# Patient Record
Sex: MALE | Race: WHITE | Employment: UNEMPLOYED | URBAN - METROPOLITAN AREA
[De-identification: names, ages, dates, MRNs, and addresses within clinical notes are randomized per-mention and may not be internally consistent; named-entity substitution may affect disease eponyms.]

---

## 2017-09-10 PROBLEM — D72.825 BANDEMIA: Status: ACTIVE | Noted: 2017-09-10

## 2018-12-28 ENCOUNTER — TELEPHONE (OUTPATIENT)
Dept: BARIATRICS/WEIGHT MGMT | Age: 23
End: 2018-12-28

## 2019-01-15 ENCOUNTER — TELEPHONE (OUTPATIENT)
Dept: BARIATRICS/WEIGHT MGMT | Age: 24
End: 2019-01-15

## 2019-01-29 ENCOUNTER — OFFICE VISIT (OUTPATIENT)
Dept: BARIATRICS/WEIGHT MGMT | Age: 24
End: 2019-01-29
Payer: COMMERCIAL

## 2019-01-29 VITALS
OXYGEN SATURATION: 98 % | SYSTOLIC BLOOD PRESSURE: 132 MMHG | WEIGHT: 315 LBS | HEART RATE: 67 BPM | HEIGHT: 69 IN | DIASTOLIC BLOOD PRESSURE: 71 MMHG | BODY MASS INDEX: 46.65 KG/M2 | RESPIRATION RATE: 18 BRPM

## 2019-01-29 DIAGNOSIS — K21.9 CHRONIC GERD: ICD-10-CM

## 2019-01-29 DIAGNOSIS — Z01.818 PRE-OPERATIVE CLEARANCE: ICD-10-CM

## 2019-01-29 DIAGNOSIS — G47.33 OBSTRUCTIVE SLEEP APNEA: ICD-10-CM

## 2019-01-29 DIAGNOSIS — E66.01 MORBID OBESITY WITH BMI OF 50.0-59.9, ADULT (HCC): Primary | ICD-10-CM

## 2019-01-29 PROCEDURE — G8417 CALC BMI ABV UP PARAM F/U: HCPCS | Performed by: SURGERY

## 2019-01-29 PROCEDURE — 1036F TOBACCO NON-USER: CPT | Performed by: SURGERY

## 2019-01-29 PROCEDURE — G8484 FLU IMMUNIZE NO ADMIN: HCPCS | Performed by: SURGERY

## 2019-01-29 PROCEDURE — G8427 DOCREV CUR MEDS BY ELIG CLIN: HCPCS | Performed by: SURGERY

## 2019-01-29 PROCEDURE — 99205 OFFICE O/P NEW HI 60 MIN: CPT | Performed by: SURGERY

## 2019-01-29 RX ORDER — DULOXETIN HYDROCHLORIDE 60 MG/1
60 CAPSULE, DELAYED RELEASE ORAL DAILY
COMMUNITY

## 2019-02-28 PROBLEM — Z01.818 PRE-OPERATIVE CLEARANCE: Status: RESOLVED | Noted: 2019-01-29 | Resolved: 2019-02-28

## 2019-03-04 ENCOUNTER — HOSPITAL ENCOUNTER (OUTPATIENT)
Age: 24
Discharge: HOME | End: 2019-03-04
Payer: COMMERCIAL

## 2019-03-04 ENCOUNTER — HOSPITAL ENCOUNTER (OUTPATIENT)
Dept: ULTRASOUND IMAGING | Age: 24
Discharge: HOME | End: 2019-03-04
Payer: COMMERCIAL

## 2019-03-04 DIAGNOSIS — E66.01 MORBID OBESITY WITH BMI OF 50.0-59.9, ADULT (HCC): ICD-10-CM

## 2019-03-04 DIAGNOSIS — K21.9 CHRONIC GERD: ICD-10-CM

## 2019-03-04 DIAGNOSIS — Z01.818 PRE-OPERATIVE CLEARANCE: ICD-10-CM

## 2019-03-04 DIAGNOSIS — G47.33 OBSTRUCTIVE SLEEP APNEA: ICD-10-CM

## 2019-03-04 LAB
A/G RATIO: 1.2 (ref 1.1–2.2)
ALBUMIN SERPL-MCNC: 4.2 G/DL (ref 3.4–5)
ALP BLD-CCNC: 97 U/L (ref 40–129)
ALT SERPL-CCNC: 19 U/L (ref 10–40)
ANION GAP SERPL CALCULATED.3IONS-SCNC: 14 MMOL/L (ref 3–16)
AST SERPL-CCNC: 13 U/L (ref 15–37)
BASOPHILS ABSOLUTE: 0 K/UL (ref 0–0.2)
BASOPHILS RELATIVE PERCENT: 0 %
BILIRUB SERPL-MCNC: <0.2 MG/DL (ref 0–1)
BUN BLDV-MCNC: 11 MG/DL (ref 7–20)
CALCIUM SERPL-MCNC: 9.8 MG/DL (ref 8.3–10.6)
CHLORIDE BLD-SCNC: 104 MMOL/L (ref 99–110)
CHOLESTEROL, TOTAL: 219 MG/DL (ref 0–199)
CO2: 26 MMOL/L (ref 21–32)
CREAT SERPL-MCNC: 0.7 MG/DL (ref 0.9–1.3)
EOSINOPHILS ABSOLUTE: 0.3 K/UL (ref 0–0.6)
EOSINOPHILS RELATIVE PERCENT: 4 %
ESTIMATED AVERAGE GLUCOSE: 114 MG/DL
FOLATE: 6.03 NG/ML (ref 4.78–24.2)
GFR AFRICAN AMERICAN: >60
GFR NON-AFRICAN AMERICAN: >60
GLOBULIN: 3.4 G/DL
GLUCOSE BLD-MCNC: 93 MG/DL (ref 70–99)
HBA1C MFR BLD: 5.6 %
HCT VFR BLD CALC: 39.9 % (ref 40.5–52.5)
HDLC SERPL-MCNC: 36 MG/DL (ref 40–60)
HEMOGLOBIN: 13.1 G/DL (ref 13.5–17.5)
IRON SATURATION: 12 % (ref 20–50)
IRON: 36 UG/DL (ref 59–158)
LDL CHOLESTEROL CALCULATED: 157 MG/DL
LYMPHOCYTES ABSOLUTE: 3 K/UL (ref 1–5.1)
LYMPHOCYTES RELATIVE PERCENT: 35 %
MCH RBC QN AUTO: 27.4 PG (ref 26–34)
MCHC RBC AUTO-ENTMCNC: 32.9 G/DL (ref 31–36)
MCV RBC AUTO: 83.1 FL (ref 80–100)
METAMYELOCYTES RELATIVE PERCENT: 1 %
MONOCYTES ABSOLUTE: 0.3 K/UL (ref 0–1.3)
MONOCYTES RELATIVE PERCENT: 3 %
NEUTROPHILS ABSOLUTE: 5 K/UL (ref 1.7–7.7)
NEUTROPHILS RELATIVE PERCENT: 57 %
PDW BLD-RTO: 15.1 % (ref 12.4–15.4)
PLATELET # BLD: 327 K/UL (ref 135–450)
PMV BLD AUTO: 8.3 FL (ref 5–10.5)
POTASSIUM SERPL-SCNC: 3.9 MMOL/L (ref 3.5–5.1)
RBC # BLD: 4.8 M/UL (ref 4.2–5.9)
RBC # BLD: NORMAL 10*6/UL
SODIUM BLD-SCNC: 144 MMOL/L (ref 136–145)
TOTAL IRON BINDING CAPACITY: 305 UG/DL (ref 260–445)
TOTAL PROTEIN: 7.6 G/DL (ref 6.4–8.2)
TRIGL SERPL-MCNC: 129 MG/DL (ref 0–150)
TSH REFLEX: 3.19 UIU/ML (ref 0.27–4.2)
VITAMIN B-12: 450 PG/ML (ref 211–911)
VITAMIN D 25-HYDROXY: 9.3 NG/ML
VLDLC SERPL CALC-MCNC: 26 MG/DL
WBC # BLD: 8.7 K/UL (ref 4–11)

## 2019-03-04 PROCEDURE — 83550 IRON BINDING TEST: CPT

## 2019-03-04 PROCEDURE — 82607 VITAMIN B-12: CPT

## 2019-03-04 PROCEDURE — 80061 LIPID PANEL: CPT

## 2019-03-04 PROCEDURE — 76705 ECHO EXAM OF ABDOMEN: CPT

## 2019-03-04 PROCEDURE — 83036 HEMOGLOBIN GLYCOSYLATED A1C: CPT

## 2019-03-04 PROCEDURE — 84597 ASSAY OF VITAMIN K: CPT

## 2019-03-04 PROCEDURE — 84207 ASSAY OF VITAMIN B-6: CPT

## 2019-03-04 PROCEDURE — 84446 ASSAY OF VITAMIN E: CPT

## 2019-03-04 PROCEDURE — 82306 VITAMIN D 25 HYDROXY: CPT

## 2019-03-04 PROCEDURE — 84425 ASSAY OF VITAMIN B-1: CPT

## 2019-03-04 PROCEDURE — 84590 ASSAY OF VITAMIN A: CPT

## 2019-03-04 PROCEDURE — 80053 COMPREHEN METABOLIC PANEL: CPT

## 2019-03-04 PROCEDURE — 36415 COLL VENOUS BLD VENIPUNCTURE: CPT

## 2019-03-04 PROCEDURE — 82746 ASSAY OF FOLIC ACID SERUM: CPT

## 2019-03-04 PROCEDURE — 85025 COMPLETE CBC W/AUTO DIFF WBC: CPT

## 2019-03-04 PROCEDURE — 84443 ASSAY THYROID STIM HORMONE: CPT

## 2019-03-04 PROCEDURE — 83540 ASSAY OF IRON: CPT

## 2019-03-06 ENCOUNTER — OFFICE VISIT (OUTPATIENT)
Dept: BARIATRICS/WEIGHT MGMT | Age: 24
End: 2019-03-06
Payer: COMMERCIAL

## 2019-03-06 VITALS
DIASTOLIC BLOOD PRESSURE: 78 MMHG | OXYGEN SATURATION: 99 % | WEIGHT: 315 LBS | BODY MASS INDEX: 46.65 KG/M2 | RESPIRATION RATE: 16 BRPM | HEART RATE: 86 BPM | HEIGHT: 69 IN | SYSTOLIC BLOOD PRESSURE: 136 MMHG

## 2019-03-06 DIAGNOSIS — E61.1 IRON DEFICIENCY: ICD-10-CM

## 2019-03-06 DIAGNOSIS — E78.5 HYPERLIPIDEMIA, UNSPECIFIED HYPERLIPIDEMIA TYPE: ICD-10-CM

## 2019-03-06 DIAGNOSIS — K21.9 CHRONIC GERD: ICD-10-CM

## 2019-03-06 DIAGNOSIS — E66.01 MORBID OBESITY WITH BMI OF 50.0-59.9, ADULT (HCC): Primary | ICD-10-CM

## 2019-03-06 DIAGNOSIS — E55.9 VITAMIN D DEFICIENCY: ICD-10-CM

## 2019-03-06 DIAGNOSIS — K76.0 FATTY LIVER: ICD-10-CM

## 2019-03-06 PROCEDURE — 99213 OFFICE O/P EST LOW 20 MIN: CPT | Performed by: NURSE PRACTITIONER

## 2019-03-06 RX ORDER — FERROUS SULFATE 325(65) MG
325 TABLET ORAL 2 TIMES DAILY WITH MEALS
Qty: 60 TABLET | Refills: 3 | Status: SHIPPED | OUTPATIENT
Start: 2019-03-06 | End: 2019-06-10

## 2019-03-06 RX ORDER — ERGOCALCIFEROL 1.25 MG/1
50000 CAPSULE ORAL WEEKLY
Qty: 4 CAPSULE | Refills: 2 | Status: SHIPPED | OUTPATIENT
Start: 2019-03-06 | End: 2019-06-10

## 2019-03-06 ASSESSMENT — ENCOUNTER SYMPTOMS
EYES NEGATIVE: 1
RESPIRATORY NEGATIVE: 1
ALLERGIC/IMMUNOLOGIC NEGATIVE: 1
GASTROINTESTINAL NEGATIVE: 1

## 2019-03-07 LAB
ALPHA-TOCOPHEROL: 6.9 MG/L (ref 5.5–18)
GAMMA-TOCOPHEROL: 1.9 MG/L (ref 0–6)
RETINYL PALMITATE: <0.02 MG/L (ref 0–0.1)
VITAMIN A LEVEL: 0.38 MG/L (ref 0.3–1.2)
VITAMIN A, INTERP: NORMAL
VITAMIN B1, PLASMA: 4 NMOL/L (ref 4–15)
VITAMIN B6: 11 NMOL/L (ref 20–125)
VITAMIN K1: 0.29 NMOL/L (ref 0.22–4.88)

## 2019-03-31 ASSESSMENT — ENCOUNTER SYMPTOMS
EYES NEGATIVE: 1
RESPIRATORY NEGATIVE: 1
GASTROINTESTINAL NEGATIVE: 1
ALLERGIC/IMMUNOLOGIC NEGATIVE: 1

## 2019-03-31 NOTE — PROGRESS NOTES
Never Used   Substance Use Topics    Alcohol use: Yes     Comment: rare     I counseled the patient on the importance of not smoking and risks of ETOH. No Known Allergies  Vitals:    04/08/19 1312   BP: 136/82   Pulse: 66   Resp: 16   SpO2: 98%   Weight: (!) 355 lb (161 kg)   Height: 5' 9\" (1.753 m)     Body mass index is 52.42 kg/m².     Current Outpatient Medications:     vitamin D (ERGOCALCIFEROL) 00744 units CAPS capsule, Take 1 capsule by mouth once a week, Disp: 4 capsule, Rfl: 2    ferrous sulfate 325 (65 Fe) MG tablet, Take 1 tablet by mouth 2 times daily (with meals), Disp: 60 tablet, Rfl: 3    DULoxetine (CYMBALTA) 60 MG extended release capsule, Take 60 mg by mouth daily, Disp: , Rfl:     ARIPiprazole (ABILIFY) 15 MG tablet, Take 20 mg by mouth, Disp: , Rfl:     atorvastatin (LIPITOR) 10 MG tablet, Take 10 mg by mouth, Disp: , Rfl:     methylphenidate (CONCERTA) 18 MG extended release tablet, Take 18 mg by mouth ., Disp: , Rfl:     Lab Results   Component Value Date    WBC 8.7 03/04/2019    RBC 4.80 03/04/2019    HGB 13.1 03/04/2019    HCT 39.9 03/04/2019    MCV 83.1 03/04/2019    MCH 27.4 03/04/2019    MCHC 32.9 03/04/2019    MPV 8.3 03/04/2019    NEUTOPHILPCT 57.0 03/04/2019    LYMPHOPCT 35.0 03/04/2019    MONOPCT 3.0 03/04/2019    EOSRELPCT 4.0 03/04/2019    BASOPCT 0.0 03/04/2019    NEUTROABS 5.0 03/04/2019    LYMPHSABS 3.0 03/04/2019    MONOSABS 0.3 03/04/2019    EOSABS 0.3 03/04/2019     Lab Results   Component Value Date     03/04/2019    K 3.9 03/04/2019     03/04/2019    CO2 26 03/04/2019    ANIONGAP 14 03/04/2019    GLUCOSE 93 03/04/2019    BUN 11 03/04/2019    CREATININE 0.7 03/04/2019    LABGLOM >60 03/04/2019    GFRAA >60 03/04/2019    CALCIUM 9.8 03/04/2019    PROT 7.6 03/04/2019    LABALBU 4.2 03/04/2019    AGRATIO 1.2 03/04/2019    BILITOT <0.2 03/04/2019    ALKPHOS 97 03/04/2019    ALT 19 03/04/2019    AST 13 03/04/2019    GLOB 3.4 03/04/2019     Lab Results Component Value Date    CHOL 219 03/04/2019    TRIG 129 03/04/2019    HDL 36 03/04/2019    LDLCALC 157 03/04/2019    LABVLDL 26 03/04/2019     Lab Results   Component Value Date    TSHREFLEX 3.19 03/04/2019     Lab Results   Component Value Date    IRON 36 03/04/2019    TIBC 305 03/04/2019    LABIRON 12 03/04/2019     Lab Results   Component Value Date    BKXUONYI07 450 03/04/2019    FOLATE 6.03 03/04/2019     Lab Results   Component Value Date    VITD25 9.3 03/04/2019     Lab Results   Component Value Date    LABA1C 5.6 03/04/2019    .0 03/04/2019     Review of Systems   Constitutional: Negative. HENT: Negative. Eyes: Negative. Respiratory: Negative. Cardiovascular: Negative. Gastrointestinal: Negative. Endocrine: Negative. Genitourinary: Negative. Musculoskeletal: Negative. Skin: Negative. Allergic/Immunologic: Negative. Neurological: Negative. Hematological: Negative. Psychiatric/Behavioral: Negative. Objective:   Physical Exam   Constitutional: He is oriented to person, place, and time. He appears well-developed and well-nourished. HENT:   Head: Normocephalic and atraumatic. Eyes: Pupils are equal, round, and reactive to light. Conjunctivae are normal.   Neck: Normal range of motion. Neck supple. Cardiovascular: Normal rate. Pulmonary/Chest: Effort normal.   Abdominal: Soft. Musculoskeletal: Normal range of motion. Neurological: He is alert and oriented to person, place, and time. Skin: Skin is warm and dry. Psychiatric: He has a normal mood and affect. His behavior is normal. Judgment and thought content normal.   Vitals reviewed. Assessment and Plan:   Patient is here for their 3rd presurgery visit for sleeve, down 14 lbs. The patient's current Body mass index is 52.42 kg/m². (4/8/19). He is making dietary and behavior modifications. He did meet with the registered dietitian for continued follow up. I agree with recommendations and plan. 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 Encouraged continued physical activity. Discussed preop work up which still needs support group, EGD (scheduled) and psych evaluation (scheduled). We will see him back in 1 month for continued follow up. If the patient is able to maintain consistency with his dietary behaviors and has completed the remainder of his preoperative requirements by his next visit he should be ready for a surgery date. A total of 15 minutes was spent face-to-face with the patient and over half of that time was spent counseling the patient on proper dietary behaviors, exercise and preoperative work-up.

## 2019-04-02 ENCOUNTER — ANESTHESIA EVENT (OUTPATIENT)
Dept: ENDOSCOPY | Age: 24
End: 2019-04-02
Payer: COMMERCIAL

## 2019-04-08 ENCOUNTER — OFFICE VISIT (OUTPATIENT)
Dept: BARIATRICS/WEIGHT MGMT | Age: 24
End: 2019-04-08
Payer: COMMERCIAL

## 2019-04-08 VITALS
HEIGHT: 69 IN | DIASTOLIC BLOOD PRESSURE: 82 MMHG | SYSTOLIC BLOOD PRESSURE: 136 MMHG | BODY MASS INDEX: 46.65 KG/M2 | HEART RATE: 66 BPM | WEIGHT: 315 LBS | RESPIRATION RATE: 16 BRPM | OXYGEN SATURATION: 98 %

## 2019-04-08 DIAGNOSIS — K76.0 FATTY LIVER: ICD-10-CM

## 2019-04-08 DIAGNOSIS — E66.01 MORBID OBESITY WITH BMI OF 50.0-59.9, ADULT (HCC): Primary | ICD-10-CM

## 2019-04-08 DIAGNOSIS — E78.5 HYPERLIPIDEMIA, UNSPECIFIED HYPERLIPIDEMIA TYPE: ICD-10-CM

## 2019-04-08 DIAGNOSIS — K21.9 CHRONIC GERD: ICD-10-CM

## 2019-04-08 DIAGNOSIS — G47.33 OBSTRUCTIVE SLEEP APNEA: ICD-10-CM

## 2019-04-08 PROCEDURE — G8427 DOCREV CUR MEDS BY ELIG CLIN: HCPCS | Performed by: NURSE PRACTITIONER

## 2019-04-08 PROCEDURE — G8417 CALC BMI ABV UP PARAM F/U: HCPCS | Performed by: NURSE PRACTITIONER

## 2019-04-08 PROCEDURE — 99213 OFFICE O/P EST LOW 20 MIN: CPT | Performed by: NURSE PRACTITIONER

## 2019-04-08 PROCEDURE — 1036F TOBACCO NON-USER: CPT | Performed by: NURSE PRACTITIONER

## 2019-04-08 NOTE — PATIENT INSTRUCTIONS
Goals in preparing for bariatric surgery  You should be giving up all beverages that have carbonation, sugar, and caffeine. (Refer to the approved liquids list provided at initial visit)   You should be drinking 64 ounces of calorie free fluids per day. Suggestions include:  o Water (you may add fresh lemon or lime)  o Crystal Light  o Acra Liquid Water Enhancer  o Propel Zero  o Powerade Zero  o Isopure  o Abdgh5N  o SOBE Lifewater Zero  o Vitamin Water Zero  o Sugar Free Ian-Aid  You should be eating 4-6 times per day.  Three small meals plus 1-2 snacks per day is your goal. This balances your calories and nutrients evenly throughout the day and helps to boost your metabolism. Snacking is a good thing if you are choosing healthful options. Refer to the snack list provided at your initial visit. Aim for a protein at every snack, plus a fruit, vegetable or starch. You should be eating protein at every meal and snack.  Protein is typically found in animal sources, i.e. chicken, beef, pork, fish and seafood, eggs. It is also found in low-fat dairy sources such as skim or 1% milk, low-fat yogurt, low-fat cheese, and low-fat cottage cheese. Plant based sources of protein include peanut butter, beans, and soy. You should be utilizing the 9-inch plate method.  Eating on a smaller plate will help you control portion size. But what you put on your plate counts also. Make ¼ of your plate protein, ¼ starch and ½ the plate non-starchy vegetables. You should be eliminating caffeine.  Caffeine is dehydrating. After surgery, its very important to stay hydrated. Giving up caffeine before surgery will help you focus on the changes necessary to be successful after surgery. There are many decaffeinated coffee and tea products available in grocery stores. You should be reducing added fat and sugar in your diet.  Frying foods adds too much fat and calories.  Baking, broiling, or grilling meats add flavor without unhealthy fats. Using cooking oil spray and spray butter products are also healthful changes that will aid in your weight loss. Foods high in sugar are often also high in calories and low in nutrients. Eating habits after surgery will have to be a permanent and long-term change. Eating habits are so ingrained that it can be difficult to change. It is important to practice new eating habits prior to surgery to mentally prepare yourself for the challenge ahead. Also remember that overall health, age, and genetics make each persons weight loss progress different. Do not compare your progress (pre or post-operatively), the amount you eat, or exercise to other patients. In addition, it is the responsibility of the patient to schedule and follow up on labs and tests completed during the presurgical period. Results will be reviewed at each visit. Patient received dietary handouts and education.     Plan/Recommendations:   - Continue plan  - Eliminate Monster Zero  - Continue exercise

## 2019-04-19 ENCOUNTER — HOSPITAL ENCOUNTER (OUTPATIENT)
Age: 24
Setting detail: OUTPATIENT SURGERY
Discharge: HOME | End: 2019-04-19
Attending: SURGERY | Admitting: SURGERY
Payer: COMMERCIAL

## 2019-04-19 ENCOUNTER — ANESTHESIA (OUTPATIENT)
Dept: ENDOSCOPY | Age: 24
End: 2019-04-19
Payer: COMMERCIAL

## 2019-04-19 VITALS
TEMPERATURE: 98.4 F | HEART RATE: 69 BPM | HEIGHT: 71 IN | OXYGEN SATURATION: 96 % | SYSTOLIC BLOOD PRESSURE: 128 MMHG | RESPIRATION RATE: 16 BRPM | BODY MASS INDEX: 44.1 KG/M2 | DIASTOLIC BLOOD PRESSURE: 74 MMHG | WEIGHT: 315 LBS

## 2019-04-19 VITALS
DIASTOLIC BLOOD PRESSURE: 59 MMHG | OXYGEN SATURATION: 93 % | RESPIRATION RATE: 1 BRPM | SYSTOLIC BLOOD PRESSURE: 124 MMHG

## 2019-04-19 PROCEDURE — 3609012400 HC EGD TRANSORAL BIOPSY SINGLE/MULTIPLE: Performed by: SURGERY

## 2019-04-19 PROCEDURE — 6360000002 HC RX W HCPCS: Performed by: NURSE ANESTHETIST, CERTIFIED REGISTERED

## 2019-04-19 PROCEDURE — 2709999900 HC NON-CHARGEABLE SUPPLY: Performed by: SURGERY

## 2019-04-19 PROCEDURE — 88305 TISSUE EXAM BY PATHOLOGIST: CPT

## 2019-04-19 PROCEDURE — 2580000003 HC RX 258: Performed by: ANESTHESIOLOGY

## 2019-04-19 PROCEDURE — 2580000003 HC RX 258: Performed by: NURSE ANESTHETIST, CERTIFIED REGISTERED

## 2019-04-19 PROCEDURE — 2500000003 HC RX 250 WO HCPCS: Performed by: NURSE ANESTHETIST, CERTIFIED REGISTERED

## 2019-04-19 PROCEDURE — 7100000001 HC PACU RECOVERY - ADDTL 15 MIN: Performed by: SURGERY

## 2019-04-19 PROCEDURE — 7100000000 HC PACU RECOVERY - FIRST 15 MIN: Performed by: SURGERY

## 2019-04-19 PROCEDURE — 3700000000 HC ANESTHESIA ATTENDED CARE: Performed by: SURGERY

## 2019-04-19 PROCEDURE — 7100000011 HC PHASE II RECOVERY - ADDTL 15 MIN: Performed by: SURGERY

## 2019-04-19 PROCEDURE — 43239 EGD BIOPSY SINGLE/MULTIPLE: CPT | Performed by: SURGERY

## 2019-04-19 PROCEDURE — 7100000010 HC PHASE II RECOVERY - FIRST 15 MIN: Performed by: SURGERY

## 2019-04-19 RX ORDER — LIDOCAINE HYDROCHLORIDE 20 MG/ML
INJECTION, SOLUTION EPIDURAL; INFILTRATION; INTRACAUDAL; PERINEURAL PRN
Status: DISCONTINUED | OUTPATIENT
Start: 2019-04-19 | End: 2019-04-19 | Stop reason: SDUPTHER

## 2019-04-19 RX ORDER — LIDOCAINE HYDROCHLORIDE 10 MG/ML
1 INJECTION, SOLUTION EPIDURAL; INFILTRATION; INTRACAUDAL; PERINEURAL
Status: DISCONTINUED | OUTPATIENT
Start: 2019-04-19 | End: 2019-04-19 | Stop reason: HOSPADM

## 2019-04-19 RX ORDER — MEPERIDINE HYDROCHLORIDE 25 MG/ML
12.5 INJECTION INTRAMUSCULAR; INTRAVENOUS; SUBCUTANEOUS EVERY 5 MIN PRN
Status: DISCONTINUED | OUTPATIENT
Start: 2019-04-19 | End: 2019-04-19 | Stop reason: HOSPADM

## 2019-04-19 RX ORDER — HYDROMORPHONE HCL 110MG/55ML
0.25 PATIENT CONTROLLED ANALGESIA SYRINGE INTRAVENOUS EVERY 5 MIN PRN
Status: DISCONTINUED | OUTPATIENT
Start: 2019-04-19 | End: 2019-04-19 | Stop reason: HOSPADM

## 2019-04-19 RX ORDER — OXYCODONE HYDROCHLORIDE 5 MG/1
5 TABLET ORAL PRN
Status: DISCONTINUED | OUTPATIENT
Start: 2019-04-19 | End: 2019-04-19 | Stop reason: HOSPADM

## 2019-04-19 RX ORDER — GLYCOPYRROLATE 1 MG/5 ML
SYRINGE (ML) INTRAVENOUS PRN
Status: DISCONTINUED | OUTPATIENT
Start: 2019-04-19 | End: 2019-04-19 | Stop reason: SDUPTHER

## 2019-04-19 RX ORDER — LABETALOL HYDROCHLORIDE 5 MG/ML
5 INJECTION, SOLUTION INTRAVENOUS EVERY 10 MIN PRN
Status: DISCONTINUED | OUTPATIENT
Start: 2019-04-19 | End: 2019-04-19 | Stop reason: HOSPADM

## 2019-04-19 RX ORDER — HYDROMORPHONE HCL 110MG/55ML
0.5 PATIENT CONTROLLED ANALGESIA SYRINGE INTRAVENOUS EVERY 5 MIN PRN
Status: DISCONTINUED | OUTPATIENT
Start: 2019-04-19 | End: 2019-04-19 | Stop reason: HOSPADM

## 2019-04-19 RX ORDER — PROMETHAZINE HYDROCHLORIDE 25 MG/ML
6.25 INJECTION, SOLUTION INTRAMUSCULAR; INTRAVENOUS PRN
Status: DISCONTINUED | OUTPATIENT
Start: 2019-04-19 | End: 2019-04-19 | Stop reason: HOSPADM

## 2019-04-19 RX ORDER — OMEPRAZOLE 20 MG/1
20 CAPSULE, DELAYED RELEASE ORAL DAILY
Qty: 30 CAPSULE | Refills: 3 | Status: SHIPPED | OUTPATIENT
Start: 2019-04-19 | End: 2019-09-05 | Stop reason: SDUPTHER

## 2019-04-19 RX ORDER — OXYCODONE HYDROCHLORIDE 5 MG/1
10 TABLET ORAL PRN
Status: DISCONTINUED | OUTPATIENT
Start: 2019-04-19 | End: 2019-04-19 | Stop reason: HOSPADM

## 2019-04-19 RX ORDER — FENTANYL CITRATE 50 UG/ML
50 INJECTION, SOLUTION INTRAMUSCULAR; INTRAVENOUS EVERY 5 MIN PRN
Status: DISCONTINUED | OUTPATIENT
Start: 2019-04-19 | End: 2019-04-19 | Stop reason: HOSPADM

## 2019-04-19 RX ORDER — SODIUM CHLORIDE 0.9 % (FLUSH) 0.9 %
10 SYRINGE (ML) INJECTION PRN
Status: DISCONTINUED | OUTPATIENT
Start: 2019-04-19 | End: 2019-04-19 | Stop reason: HOSPADM

## 2019-04-19 RX ORDER — SODIUM CHLORIDE 9 MG/ML
INJECTION, SOLUTION INTRAVENOUS CONTINUOUS
Status: DISCONTINUED | OUTPATIENT
Start: 2019-04-19 | End: 2019-04-19 | Stop reason: HOSPADM

## 2019-04-19 RX ORDER — KETAMINE HYDROCHLORIDE 10 MG/ML
INJECTION, SOLUTION INTRAMUSCULAR; INTRAVENOUS PRN
Status: DISCONTINUED | OUTPATIENT
Start: 2019-04-19 | End: 2019-04-19 | Stop reason: SDUPTHER

## 2019-04-19 RX ORDER — PROPOFOL 10 MG/ML
INJECTION, EMULSION INTRAVENOUS PRN
Status: DISCONTINUED | OUTPATIENT
Start: 2019-04-19 | End: 2019-04-19 | Stop reason: SDUPTHER

## 2019-04-19 RX ORDER — SODIUM CHLORIDE 9 MG/ML
INJECTION, SOLUTION INTRAVENOUS CONTINUOUS PRN
Status: DISCONTINUED | OUTPATIENT
Start: 2019-04-19 | End: 2019-04-19 | Stop reason: SDUPTHER

## 2019-04-19 RX ORDER — DIPHENHYDRAMINE HYDROCHLORIDE 50 MG/ML
12.5 INJECTION INTRAMUSCULAR; INTRAVENOUS
Status: DISCONTINUED | OUTPATIENT
Start: 2019-04-19 | End: 2019-04-19 | Stop reason: HOSPADM

## 2019-04-19 RX ORDER — SODIUM CHLORIDE 0.9 % (FLUSH) 0.9 %
10 SYRINGE (ML) INJECTION EVERY 12 HOURS SCHEDULED
Status: DISCONTINUED | OUTPATIENT
Start: 2019-04-19 | End: 2019-04-19 | Stop reason: HOSPADM

## 2019-04-19 RX ADMIN — PROPOFOL 150 MG: 10 INJECTION, EMULSION INTRAVENOUS at 13:10

## 2019-04-19 RX ADMIN — KETAMINE HYDROCHLORIDE 20 MG: 10 INJECTION, SOLUTION INTRAMUSCULAR; INTRAVENOUS at 13:10

## 2019-04-19 RX ADMIN — Medication 0.2 MG: at 13:08

## 2019-04-19 RX ADMIN — SODIUM CHLORIDE: 9 INJECTION, SOLUTION INTRAVENOUS at 11:38

## 2019-04-19 RX ADMIN — LIDOCAINE HYDROCHLORIDE 100 MG: 20 INJECTION, SOLUTION EPIDURAL; INFILTRATION; INTRACAUDAL; PERINEURAL at 13:09

## 2019-04-19 RX ADMIN — PROPOFOL 50 MG: 10 INJECTION, EMULSION INTRAVENOUS at 13:13

## 2019-04-19 RX ADMIN — PROPOFOL 150 MG: 10 INJECTION, EMULSION INTRAVENOUS at 13:11

## 2019-04-19 RX ADMIN — SODIUM CHLORIDE: 9 INJECTION, SOLUTION INTRAVENOUS at 13:09

## 2019-04-19 ASSESSMENT — PAIN - FUNCTIONAL ASSESSMENT: PAIN_FUNCTIONAL_ASSESSMENT: 0-10

## 2019-04-19 ASSESSMENT — PULMONARY FUNCTION TESTS
PIF_VALUE: 1

## 2019-04-19 NOTE — OP NOTE
[facility]) [facility]   [facility]  [address] [place] [address], [address] [place] [id number] . Phone: [phone]  Fax: [phone]         Esophagogastroduodenoscopy     Indications: Pre-op gastric Surgery, history of / rule out Gastroesophageal reflux disease. Pre-operative Diagnosis: Obesity/pre-op bariatric surgery . Post-operative Diagnosis: same. Surgeon: Danielle Patten MD    Anesthesia: General endotracheal anesthesia    ASA Class: 3    Procedure Details   The patient was seen in the Holding Room. The risks, benefits, complications, treatment options, and expected outcomes were discussed with the patient. Pre-op Endoscopy recommended to rule any intragastric pathology that would interfere with proposed procedure /  And or due to current conditions. The possibilities of reaction to medication, pulmonary aspiration, perforation of viscus, bleeding, recurrent infection, the need for additional procedures, failure to diagnose a condition, and creating a complication requiring transfusion or operation were discussed with the patient. The patient concurred with the proposed plan, giving informed consent. The site of surgery properly noted/marked. The patient was taken to Endoscopy Suite, identified as [facility] and the procedure verified as  Esophagogastroduodenoscopy  A Time Out was held and the above information confirmed. Upper Endoscopy: An endoscope was inserted through the oropharynx into the upper esophagus. The endoscope was passed into the stomach to the level of the pylorus and passed to the duodenum where the ampulla was visualized and duodenum was normal. Then the scope was retracted back to the stomach and it was abnormal , mild antral gastritis biopsy of the antrum obtained, then retroflexed and the gastroesophageal junction was inspected . There was no hiatal hernia, no clear evidence of Zhang's.       Findings:  Esophageal findings include:             Upper: normal Esophageal Mucosa             Mid: normal Esophageal Mucosa             Distal: normal Esophageal Mucosa    Gastric findings include: abnormal Gastric Mucosa       Duodenal Findings: normal Duodenal Mucosa      Estimated Blood Loss:  Minimal      Biopsy:  Antrum,      Complications:  None; patient tolerated the procedure well. Disposition: PACU - hemodynamically stable. Condition: stable    Attending Attestation: I was present and scrubbed for the entire procedure.

## 2019-04-19 NOTE — ANESTHESIA PRE PROCEDURE
Department of Anesthesiology  Preprocedure Note       Name:  Saadia Agustin   Age:  20 y.o.  :  1995                                          MRN:  [id number]         Date:  2019      Surgeon: Matilde Philippe): Miki Westbrook MD    Procedure: EGD ESOPHAGOGASTRODUODENOSCOPY (N/A )    Medications prior to admission:   Prior to Admission medications    Medication Sig Start Date End Date Taking? Authorizing Provider   DULoxetine (CYMBALTA) 60 MG extended release capsule Take 60 mg by mouth daily   Yes Historical Provider, MD   vitamin D (ERGOCALCIFEROL) 96059 units CAPS capsule Take 1 capsule by mouth once a week 3/6/19   Verner Blitz, APRN - CNP   ferrous sulfate 325 (65 Fe) MG tablet Take 1 tablet by mouth 2 times daily (with meals) 3/6/19   Verner Blitz, APRN - CNP   ARIPiprazole (ABILIFY) 15 MG tablet Take 20 mg by mouth    Historical Provider, MD   atorvastatin (LIPITOR) 10 MG tablet Take 10 mg by mouth    Historical Provider, MD   methylphenidate (CONCERTA) 18 MG extended release tablet Take 18 mg by mouth .     Historical Provider, MD       Current medications:    Current Facility-Administered Medications   Medication Dose Route Frequency Provider Last Rate Last Dose    0.9 % sodium chloride infusion   Intravenous Continuous Micaela Weiss  mL/hr at 19 1138      lidocaine PF 1 % injection 1 mL  1 mL Intradermal Once PRN Micaela Weiss MD        HYDROmorphone (DILAUDID) injection 0.25 mg  0.25 mg Intravenous Q5 Min PRN Xiang Petty MD        fentaNYL (SUBLIMAZE) injection 50 mcg  50 mcg Intravenous Q5 Min PRN Xiang Petty MD        HYDROmorphone (DILAUDID) injection 0.25 mg  0.25 mg Intravenous Q5 Min PRN Xiang Petty MD        HYDROmorphone (DILAUDID) injection 0.5 mg  0.5 mg Intravenous Q5 Min PRN Xiang Petty MD        oxyCODONE (ROXICODONE) immediate release tablet 5 mg  5 mg Oral PRN Xiang Petty MD        Or    oxyCODONE (ROXICODONE) immediate release tablet 10 mg  10 mg Oral PRN Prachi Render, MD        diphenhydrAMINE (BENADRYL) injection 12.5 mg  12.5 mg Intravenous Once PRN Prachi Render, MD        promethazine [facility]) injection 6.25 mg  6.25 mg Intravenous PRN Prachi Render, MD        labetalol (NORMODYNE;TRANDATE) injection 5 mg  5 mg Intravenous Q10 Min PRN Prachi Render, MD        meperidine (DEMEROL) injection 12.5 mg  12.5 mg Intravenous Q5 Min PRN Prachi Render, MD           Allergies:  No Known Allergies    Problem List:    Patient Active Problem List   Diagnosis Code    Bandemia D72.825    Obstructive sleep apnea G47.33    Morbid obesity with BMI of 50.0-59.9, adult (HCC) E66.01, Z68.43    Chronic GERD K21.9    Fatty liver K76.0    Iron deficiency E61.1    Vitamin D deficiency E55.9    Hyperlipidemia, unspecified E78.5       Past Medical History:        Diagnosis Date    Anxiety     Asthma     Hypertension     Migraines     Obesity        Past Surgical History:        Procedure Laterality Date    PLANTAR FASCIA SURGERY      TONSILLECTOMY      TYMPANOSTOMY TUBE PLACEMENT         Social History:    Social History     Tobacco Use    Smoking status: Never Smoker    Smokeless tobacco: Never Used   Substance Use Topics    Alcohol use: Yes     Comment: rare                                Counseling given: Not Answered      Vital Signs (Current):   Vitals:    04/19/19 1120   BP: (!) 110/53   Pulse: 64   Resp: 16   Temp: 98.2 °F (36.8 °C)   TempSrc: Temporal   SpO2: 97%   Weight: (!) 351 lb (159.2 kg)   Height: 5' 11\" (1.803 m)                                              BP Readings from Last 3 Encounters:   04/19/19 (!) 110/53   04/08/19 136/82   03/06/19 136/78       NPO Status: Time of last liquid consumption: 2000                        Time of last solid consumption: 2000                        Date of last liquid consumption: 04/18/19                        Date of last solid food consumption: 04/18/19    BMI:   Wt Readings

## 2019-04-19 NOTE — PROGRESS NOTES
Reviewed patient's medical and surgical history in electronic record and with patient at the bedside. All questions regarding procedure answered. Scope number and equipment verified using a two person system. Family in waiting room.     Electronically signed by Leno Nunes RN on 4/19/2019 at 1:07 PM

## 2019-04-19 NOTE — H&P
H&P Update    Patient's History and Physical from April 8, 2019 was reviewed. Patient examined. There has been no change.     [facility]

## 2019-04-19 NOTE — PROGRESS NOTES
Discharge instructions reviewed with patient/responsible adult. All home medications have been reviewed, questions answered and patient and grandmother  verbalized understanding. Discharge instructions signed and copies given. Patient discharged per  with belongings.

## 2019-04-22 NOTE — ANESTHESIA POSTPROCEDURE EVALUATION
Department of Anesthesiology  Postprocedure Note    Patient: Saadia Agustin  MRN: [id number]  YOB: 1995  Date of evaluation: 4/22/2019  Time:  6:50 PM     Procedure Summary     Date:  04/19/19 Room / Location:  [facility] ENDO 05 / [facility] ENDOSCOPY    Anesthesia Start:  1309 Anesthesia Stop:  1320    Procedure:  EGD BIOPSY (N/A ) Diagnosis:  (GASTROESOPHAGEAL REFLUX DISEASE K21.9)    Surgeon:  Miki Westbrook MD Responsible Provider:  Xiang Petty MD    Anesthesia Type:  general ASA Status:  3          Anesthesia Type: general    Ana Phase I: Ana Score: 10    Ana Phase II: Ana Score: 10    Last vitals: Reviewed and per EMR flowsheets.        Anesthesia Post Evaluation    Level of consciousness: awake  Complications: no

## 2019-05-07 ENCOUNTER — INITIAL CONSULT (OUTPATIENT)
Dept: BARIATRICS/WEIGHT MGMT | Age: 24
End: 2019-05-07
Payer: COMMERCIAL

## 2019-05-07 DIAGNOSIS — E66.01 MORBID OBESITY WITH BMI OF 50.0-59.9, ADULT (HCC): ICD-10-CM

## 2019-05-07 DIAGNOSIS — F41.9 ANXIETY: Primary | ICD-10-CM

## 2019-05-07 DIAGNOSIS — F90.2 ADHD (ATTENTION DEFICIT HYPERACTIVITY DISORDER), COMBINED TYPE: ICD-10-CM

## 2019-05-07 PROCEDURE — 90791 PSYCH DIAGNOSTIC EVALUATION: CPT | Performed by: PSYCHOLOGIST

## 2019-05-07 NOTE — PROGRESS NOTES
Ilan Leslie presented for his presurgical psychological evaluation on 05/07/19. The evaluation consisted of a clinical interview, the Eating Habits Checklist, and the [facility] Diagnostic (MBMD). Based on the evaluation, Ilan Leslie is considered to be an appropriate candidate for bariatric surgery from a psychological standpoint, provided he demonstrates the ability to effectively implement and sustain presurgical dietary recommendations. He acknowledges a longstanding history of anxiety. He states he was also diagnosed with Attention Deficit Hyperactivity Disorder (ADHD) and \"anger issues\" in elementary school, the latter of which he attributes to \"daddy issues\" and being \"treated differently\" as a child by his stepfathers. He is currently prescribed Cymbalta 60mg by his PCP. His medical record indicates he is also prescribed Concerta and Abilify, though he denies taking either medication at this time. He participated in counseling and was briefly hospitalized in elementary school for depression and suicidal ideation. He reports no history of psychological intervention as an adult. He denies a history of suicidal ideation or suicide attempts as an adult, and has never been hospitalized psychiatrically as an adult. There is no indication of chemical abuse or dependence. He acknowledges using CBD pills for his bilateral foot pain. He reports feeling hurt and angry about having been treated differently by his family throughout his life; however, he denies a history of trauma or abuse as a child. Ilan Leslie has never been diagnosed with an eating disorder, and his responses in the interview and on the Eating Habits Checklist do not warrant a clinical diagnosis. He does, however, acknowledge eating in response to emotional stress and boredom on occasion.  He reports a pattern of intermittent fasting from 12am-12pm in which he eats breakfast at 12pm, a snack at 3pm, dinner, and then an evening snack around 10pm. He endorsed self-punitive thoughts and feelings related to his weight and/or eating behaviors. He denies binge eating or purging behavior. Ilan Leslie has applied for medical disability due to his bilateral foot pain. 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 \" He is single and currently resides with his mother, stepfather, maternal grandmother, and 15 y/o sister. He notes he did not meet his biological dad until he was 23years old, and now spends the summers in [place] with his side of the family. Ilan Leslie completed the MBMD as part of the evaluation. His profile is valid. Results indicate eating is a possible problem area at this time. He endorsed more depressive symptoms than the typical medical patient, which may be expressed as agitation or anger, particularly when he is under stress. He may expect disappointment and complain about a lack of support or care from others. As a medical patient, his moodiness may result in a highly variable pattern of reactions to illness or changes in his medical status. He is likely to be erratic in both his symptom presentation and in the manner in which he engages with his providers. A firm and consistent approach in which a well-defined set of treatment directives can be reinforced over time is likely to be most effective. Mr. Qamar Wu endorsed more functional deficits, pain sensitivity, and future pessimism than the typical medical patient. He may exhibit a strong emotional reaction to significant changes in his medical status, and may be prone to over-utilize healthcare resources. There are no significant coping assets reflected in his profile at this time.      Ilan Leslie exhibited adequate awareness of the risks of bariatric surgery; however, he believes the benefits will outweigh the potential risks, as he hopes to minimize or reverse the effects of several medical concerns, including sleep apnea, GERD, hyperlipidemia, fatty liver, hypertension, asthma, migraines, and bilateral foot pain. He reports realistic expectations for the procedure, as his overall goals include improved health, reduced pain, increased activity, and a goal weight of 200-250 lbs. He understands the need for permanent lifestyle change, including dietary modifications and a regular exercise program, and expressed willingness to implement the necessary changes. He expressed a commitment to comply with treatment recommendations through this office. He identified his family in [place] as a good support system in his efforts to meet his weight management goals. In summary, Saadia Agustin is considered to be an appropriate candidate for bariatric surgery from a psychological standpoint, provided he demonstrates the ability to effectively implement and sustain presurgical dietary recommendations. He was encouraged to participate in support group activities through [facility], and to consult with our staff should he experience any significant post-surgical mood changes or have difficulty modifying his eating behaviors. Feel free to consult with me as needed with any further questions regarding this evaluation.

## 2019-05-14 ENCOUNTER — OFFICE VISIT (OUTPATIENT)
Dept: BARIATRICS/WEIGHT MGMT | Age: 24
End: 2019-05-14
Payer: COMMERCIAL

## 2019-05-14 VITALS
WEIGHT: 315 LBS | BODY MASS INDEX: 46.65 KG/M2 | HEART RATE: 62 BPM | OXYGEN SATURATION: 98 % | SYSTOLIC BLOOD PRESSURE: 121 MMHG | RESPIRATION RATE: 18 BRPM | DIASTOLIC BLOOD PRESSURE: 72 MMHG | HEIGHT: 69 IN

## 2019-05-14 DIAGNOSIS — E66.01 MORBID OBESITY WITH BMI OF 50.0-59.9, ADULT (HCC): Primary | ICD-10-CM

## 2019-05-14 DIAGNOSIS — G47.33 OBSTRUCTIVE SLEEP APNEA: ICD-10-CM

## 2019-05-14 DIAGNOSIS — K76.0 FATTY LIVER: ICD-10-CM

## 2019-05-14 DIAGNOSIS — K21.9 CHRONIC GERD: ICD-10-CM

## 2019-05-14 PROBLEM — E78.2 HYPERLIPIDEMIA, MIXED: Status: ACTIVE | Noted: 2019-03-06

## 2019-05-14 PROCEDURE — 99213 OFFICE O/P EST LOW 20 MIN: CPT | Performed by: SURGERY

## 2019-05-14 PROCEDURE — G8417 CALC BMI ABV UP PARAM F/U: HCPCS | Performed by: SURGERY

## 2019-05-14 PROCEDURE — G8427 DOCREV CUR MEDS BY ELIG CLIN: HCPCS | Performed by: SURGERY

## 2019-05-14 PROCEDURE — 1036F TOBACCO NON-USER: CPT | Performed by: SURGERY

## 2019-05-14 NOTE — PROGRESS NOTES
[facility]) Physicians   [employer] [facility] [employer]  Teena Soriano MD, [facility], [address] [place] [address]    [place]  [address] . Phone: [phone]  Fax: [phone]          Chief Complaint   Patient presents with    Obesity     4th presurgery weigh         HPI:     Oscar Franklin is a very pleasant 20 y.o. male with Body mass index is 51.27 kg/m². / Chronic Obesity. Evelin Yang has been struggling for several years now with obesity. Evelin Yang feels the weight is an obstacle to achieve and perform things in daily living as well risk on health. Patient  is very determined to lose weight and be healthy, and is working towards  surgical weight loss to achieve this goal. Pre-operative clearance and work up pending. Working hard to keep good dietary habits as well level of activity. Patient denies any nausea, vomiting, fevers, chills, shortness of breath, chest pain, cough, constipation or difficulty urinating.     Pain Assessment   Denies any abdominal pain       Past Medical History:   Diagnosis Date    Anxiety     Asthma     Hypertension     Migraines     Obesity      Past Surgical History:   Procedure Laterality Date    PLANTAR FASCIA SURGERY      TONSILLECTOMY      TYMPANOSTOMY TUBE PLACEMENT      UPPER GASTROINTESTINAL ENDOSCOPY N/A 4/19/2019    EGD BIOPSY performed by Matthew Owen MD at [address] [place] [address]     Family History   Problem Relation Age of Onset   [facility] Asthma Mother     Depression Mother     High Cholesterol Mother    [facility] Migraines Mother     Obesity Mother     Diabetes Father     Hypertension Father     High Cholesterol Father     Obesity Father     Asthma Sister     Coronary Art Dis Sister     Coronary Art Dis Brother     Coronary Art Dis Maternal Uncle     Mental Illness Maternal Uncle     Asthma Paternal Aunt     Coronary Art Dis Paternal Aunt     Osteoarthritis Paternal Aunt     Cancer Maternal Grandmother     Hypertension Maternal Grandmother     High Cholesterol Maternal Grandmother     Migraines Maternal Grandmother     Irritable Bowel Syndrome Maternal Grandmother     Osteoarthritis Maternal Grandmother     Coronary Art Dis Maternal Grandfather     Cancer Maternal Grandfather     Stroke Maternal Grandfather     Diabetes Maternal Grandfather     Hypertension Maternal Grandfather     High Cholesterol Maternal Grandfather     Mental Illness Maternal Grandfather     Obesity Maternal Grandfather      Social History     Tobacco Use    Smoking status: Never Smoker    Smokeless tobacco: Never Used   Substance Use Topics    Alcohol use: Yes     Comment: rare     I counseled the patient on the importance of not smoking and risks of ETOH. No Known Allergies  Vitals:    05/14/19 1114   BP: 121/72   Pulse: 62   Resp: 18   SpO2: 98%   Weight: (!) 347 lb 3.2 oz (157.5 kg)   Height: 5' 9\" (1.753 m)       Body mass index is 51.27 kg/m².     Lab Results   Component Value Date    WBC 8.7 03/04/2019    RBC 4.80 03/04/2019    HGB 13.1 03/04/2019    HCT 39.9 03/04/2019    MCV 83.1 03/04/2019    MCH 27.4 03/04/2019    MCHC 32.9 03/04/2019    MPV 8.3 03/04/2019    NEUTOPHILPCT 57.0 03/04/2019    LYMPHOPCT 35.0 03/04/2019    MONOPCT 3.0 03/04/2019    EOSRELPCT 4.0 03/04/2019    BASOPCT 0.0 03/04/2019    NEUTROABS 5.0 03/04/2019    LYMPHSABS 3.0 03/04/2019    MONOSABS 0.3 03/04/2019    EOSABS 0.3 03/04/2019     Lab Results   Component Value Date     03/04/2019    K 3.9 03/04/2019     03/04/2019    CO2 26 03/04/2019    ANIONGAP 14 03/04/2019    GLUCOSE 93 03/04/2019    BUN 11 03/04/2019    CREATININE 0.7 03/04/2019    LABGLOM >60 03/04/2019    GFRAA >60 03/04/2019    CALCIUM 9.8 03/04/2019    PROT 7.6 03/04/2019    LABALBU 4.2 03/04/2019    AGRATIO 1.2 03/04/2019    BILITOT <0.2 03/04/2019    ALKPHOS 97 03/04/2019    ALT 19 03/04/2019    AST 13 03/04/2019    GLOB 3.4 03/04/2019     Lab Results   Component Value Date    CHOL 219 03/04/2019    TRIG 129 03/04/2019    HDL 36 03/04/2019    LDLCALC 157 03/04/2019    LABVLDL 26 03/04/2019     Lab Results   Component Value Date    TSHREFLEX 3.19 03/04/2019     Lab Results   Component Value Date    IRON 36 03/04/2019    TIBC 305 03/04/2019    LABIRON 12 03/04/2019     Lab Results   Component Value Date    MAKNUXIE04 450 03/04/2019    FOLATE 6.03 03/04/2019     Lab Results   Component Value Date    VITD25 9.3 03/04/2019     Lab Results   Component Value Date    LABA1C 5.6 03/04/2019    .0 03/04/2019         Current Outpatient Medications:     omeprazole (PRILOSEC) 20 MG delayed release capsule, Take 1 capsule by mouth Daily, Disp: 30 capsule, Rfl: 3    vitamin D (ERGOCALCIFEROL) 60860 units CAPS capsule, Take 1 capsule by mouth once a week, Disp: 4 capsule, Rfl: 2    ferrous sulfate 325 (65 Fe) MG tablet, Take 1 tablet by mouth 2 times daily (with meals), Disp: 60 tablet, Rfl: 3    DULoxetine (CYMBALTA) 60 MG extended release capsule, Take 60 mg by mouth daily, Disp: , Rfl:     ARIPiprazole (ABILIFY) 15 MG tablet, Take 20 mg by mouth, Disp: , Rfl:     atorvastatin (LIPITOR) 10 MG tablet, Take 10 mg by mouth, Disp: , Rfl:     methylphenidate (CONCERTA) 18 MG extended release tablet, Take 18 mg by mouth ., Disp: , Rfl:     Review of Systems - History obtained from the patient  General ROS: negative  Psychological ROS: negative  Ophthalmic ROS: negative  Neurological ROS: negative  ENT ROS: negative  Allergy and Immunology ROS: negative  Hematological and Lymphatic ROS: negative  Endocrine ROS: negative  Breast ROS: negative  Respiratory ROS: negative  Cardiovascular ROS: negative  Gastrointestinal ROS:negative  Genito-Urinary ROS: negative  Musculoskeletal ROS: negative   Skin ROS: negative    Physical Exam   Vitals Reviewed   Constitutional: Patient is oriented to person, place, and time. Patient appears well-developed and well-nourished. Patient is active and cooperative. Non-toxic appearance. No distress. HENT:   Head: Normocephalic and atraumatic. Head is without abrasion and without laceration. Hair is normal.   Right Ear: External ear normal. No lacerations. No drainage, swelling . Left Ear: External ear normal. No lacerations. No drainage, swelling. Nose: Nose normal. No nose lacerations or nasal deformity. Eyes: Conjunctivae, EOM and lids are normal. Right eye exhibits no discharge. No foreign body present in the right eye. Left eye exhibits no discharge. No foreign body present in the left eye. No scleral icterus. Neck: Trachea normal and normal range of motion. No JVD present. Pulmonary/Chest: Effort normal. No accessory muscle usage or stridor. No apnea. No respiratory distress. Cardiovascular: Normal rate and no JVD. Abdominal: Normal appearance. Patient exhibits no distension. Abdomen is soft, obese, non tender. Musculoskeletal: Normal range of motion. Patient exhibits no edema. Neurological: Patient is alert and oriented to person, place, and time. Patient has normal strength. GCS eye subscore is 4. GCS verbal subscore is 5. GCS motor subscore is 6. Skin: Skin is warm and dry. No abrasion and no rash noted. Patient is not diaphoretic. No cyanosis or erythema. Psychiatric: Patient has a normal mood and affect. Speech is normal and behavior is normal. Cognition and memory are normal.       A/P    Nehemiah Slaughter is 20 y.o. male, Body mass index is 51.27 kg/m². pre surgery, has lost 8 lbs (over 30 lbs so far) since last visit. The patient underwent dietary counseling with registered dietician. I have reviewed, discussed and agree with the dietary plan. Patient is trying hard to keep good dietary and behavior modifications. Patient is monitoring portion sizes, food choices and liquid calories. Patient is trying to exercise regularly as much as possible.   We discussed how his weight affects his overall health including:  Patient Active Problem List   Diagnosis    Bandemia    Obstructive

## 2019-05-14 NOTE — PROGRESS NOTES
Gordon Cleveland lost 7.8 lbs over past 5 weeks. Does intermittent fasting and eats noon to midnight. Breakfast: nothing    Snack: nothing    Lunch: 1/2 cup baked chicken with parmesan cheese    Snack: sometimes - egg OR string cheese     Dinner: 1/2 cup baked chicken with parmesan cheese sometimes with green beans/corn and mashed potatoes     Snack: mandarin orange OR another egg and string cheese     Fluids: water, SF koolaid, eliminated monster zero     Is pt consuming smaller portions? Overall portions are good but needs to work on plate distribution    Is pt consuming at least 64 oz of fluids per day? Yes     Is pt consuming carbonated, caffeinated, or sugary beverages? No     Has pt sampled Unjury and/or Nectar protein?  Tried but still trying to find flavors he likes    Exercise: Peddler 5 x week for an hour     Plan/Recommendations:   Plate distribution -increase non-starchy veggies and decrease starchy veggies  Continue to try protein powder to find options he likes  Continue with exercise  Discussed importance of eating 4-6 x day    Handouts: none     Brody Leon

## 2019-06-07 ENCOUNTER — OFFICE VISIT (OUTPATIENT)
Dept: BARIATRICS/WEIGHT MGMT | Age: 24
End: 2019-06-07
Payer: COMMERCIAL

## 2019-06-07 VITALS
BODY MASS INDEX: 46.65 KG/M2 | DIASTOLIC BLOOD PRESSURE: 62 MMHG | SYSTOLIC BLOOD PRESSURE: 126 MMHG | OXYGEN SATURATION: 98 % | HEART RATE: 56 BPM | HEIGHT: 69 IN | RESPIRATION RATE: 18 BRPM | WEIGHT: 315 LBS

## 2019-06-07 DIAGNOSIS — K21.9 CHRONIC GERD: ICD-10-CM

## 2019-06-07 DIAGNOSIS — G47.33 OBSTRUCTIVE SLEEP APNEA: ICD-10-CM

## 2019-06-07 DIAGNOSIS — E66.01 MORBID OBESITY WITH BMI OF 50.0-59.9, ADULT (HCC): Primary | ICD-10-CM

## 2019-06-07 DIAGNOSIS — K76.0 FATTY LIVER: ICD-10-CM

## 2019-06-07 DIAGNOSIS — E78.2 HYPERLIPIDEMIA, MIXED: ICD-10-CM

## 2019-06-07 PROCEDURE — 99213 OFFICE O/P EST LOW 20 MIN: CPT | Performed by: SURGERY

## 2019-06-07 PROCEDURE — G8427 DOCREV CUR MEDS BY ELIG CLIN: HCPCS | Performed by: SURGERY

## 2019-06-07 PROCEDURE — 1036F TOBACCO NON-USER: CPT | Performed by: SURGERY

## 2019-06-07 PROCEDURE — G8417 CALC BMI ABV UP PARAM F/U: HCPCS | Performed by: SURGERY

## 2019-06-07 NOTE — PROGRESS NOTES
Gordon Cleveland lost 4 lbs over 1 month. Pt states he is staying consistent with healthy food choices. Breakfast: eggs x 2     Snack: nothing     Lunch: 1/2 cup baked chicken with parmesan cheese     Snack: sometimes - egg OR string cheese      Dinner: roast beef wrap x 2 made at home       Snack: mandarin orange and an egg       Fluids: water, SF koolaid     Is pt consuming smaller portions? Yes     Is pt consuming at least 64 oz of fluids per day? Yes      Is pt consuming carbonated, caffeinated, or sugary beverages? No      Has pt sampled Unjury and/or Nectar protein?  Yes     Exercise: Peddler 3 x week for an hour      Plan/Recommendations: Increase non starchy veggie - at least once a day    Handouts: none    Keegan Vaughan

## 2019-06-07 NOTE — PROGRESS NOTES
[address]  Physicians   [facility] [employer]  Kristin Fuentes MD, [address] [facility], [address] [place] [address]    [place] [address] . Phone: [phone]  Fax: [phone]        HPI:     Yvette Perea is a very pleasant 20 y.o. male with Body mass index is 50.68 kg/m². , Pre-Surgery. Pre-operative clearance and work up done. Working hard to keep good dietary habits as well level of activity. Patient denies any nausea, vomiting, fevers, chills, shortness of breath, chest pain, cough, constipation or difficulty urinating.     Pain Assessment   Denies any abdominal pain       Past Medical History:   Diagnosis Date    Anxiety     Asthma     Hypertension     Migraines     Obesity      Past Surgical History:   Procedure Laterality Date    PLANTAR FASCIA SURGERY      TONSILLECTOMY      TYMPANOSTOMY TUBE PLACEMENT      UPPER GASTROINTESTINAL ENDOSCOPY N/A 4/19/2019    EGD BIOPSY performed by Mali Ochoa MD at [address]     Family History   Problem Relation Age of Onset   [facility] Asthma Mother     Depression Mother     High Cholesterol Mother    [facility] Migraines Mother     Obesity Mother     Diabetes Father     Hypertension Father     High Cholesterol Father     Obesity Father     Asthma Sister     Coronary Art Dis Sister     Coronary Art Dis Brother     Coronary Art Dis Maternal Uncle     Mental Illness Maternal Uncle     Asthma Paternal Aunt     Coronary Art Dis Paternal Aunt     Osteoarthritis Paternal Aunt     Cancer Maternal Grandmother     Hypertension Maternal Grandmother     High Cholesterol Maternal Grandmother     Migraines Maternal Grandmother     Irritable Bowel Syndrome Maternal Grandmother     Osteoarthritis Maternal Grandmother     Coronary Art Dis Maternal Grandfather     Cancer Maternal Grandfather     Stroke Maternal Grandfather     Diabetes Maternal Grandfather     Hypertension Maternal Grandfather     High Cholesterol Maternal Grandfather     Mental Illness Head: Normocephalic and atraumatic. Head is without abrasion and without laceration. Hair is normal.   Right Ear: External ear normal. No lacerations. No drainage, swelling . Left Ear: External ear normal. No lacerations. No drainage, swelling. Nose: Nose normal. No nose lacerations or nasal deformity. Eyes: Conjunctivae, EOM and lids are normal. Right eye exhibits no discharge. No foreign body present in the right eye. Left eye exhibits no discharge. No foreign body present in the left eye. No scleral icterus. Neck: Trachea normal and normal range of motion. No JVD present. Pulmonary/Chest: Effort normal. No accessory muscle usage or stridor. No apnea. No respiratory distress. Cardiovascular: Normal rate and no JVD. Abdominal: Normal appearance. Patient exhibits no distension. Abdomen is soft, obese, non tender. Musculoskeletal: Normal range of motion. Patient exhibits no edema. Neurological: Patient is alert and oriented to person, place, and time. Patient has normal strength. GCS eye subscore is 4. GCS verbal subscore is 5. GCS motor subscore is 6. Skin: Skin is warm and dry. No abrasion and no rash noted. Patient is not diaphoretic. No cyanosis or erythema. Psychiatric: Patient has a normal mood and affect. Speech is normal and behavior is normal. Cognition and memory are normal.       A/P       Kinjal Kay is 20 y.o. male, Body mass index is 50.68 kg/m². pre surgery. The patient underwent dietary counseling with registered dietician. I have reviewed, discussed and agree with the dietary plan. Patient is trying hard to keep good dietary and behavior modifications. Patient is monitoring portion sizes, food choices and liquid calories. Patient is trying to exercise regularly as much as possible.     We discussed how his weight affects his overall health including:  Patient Active Problem List   Diagnosis    Bandemia    Obstructive sleep apnea    Morbid obesity with BMI of 50.0-59.9, adult [facility])    Chronic GERD    Fatty liver    Iron deficiency    Vitamin D deficiency    Hyperlipidemia, mixed    and importance of weight loss to alleviate those co morbid conditions. I encouraged the patient to continue exercise and keeping healthy eating habits. Discussed pre-op labs and work up till now. Also counseled the patient extensively on Surgery. I spent a total of 15 minutes face to face with the patient and greater than 50% of the time was spent in counseling, answering questions, addressing concerns, reviewing labs and/or other studies with the patient as well as coordinating care. Radha Hitchcock is a 20 y.o. male with Body mass index is 50.68 kg/m². ,  patient is deemed appropriate candidate for weight loss surgery by our multidisciplinary team.     Both open and laparoscopic approach were explained in details. Benefits and risks explained. Informed consent obtained. Risks including but not limited to; Infection, bleeding, gastric leak or obstruction, persistent nausea, vomiting, or reflux, injury to surrounding structures, risks of anesthesia, stricture, delayed gastric emptying, staple line leak, incisional hernia, malnutrition , hair loss, and/ or Conversion to Open surgery may be necessary. Failure to lose or maintain weight loss, Gallstones or Kidney Stones, Deep Venous Thrombosis , pulmonary embolism and / or death. With obesity and/or Fatty liver , I may elect to perform liver core biopsy to have  Baseline idea on liver pathology if there is abnormal Liver Functions or if there is hepatomegaly &/or lesion, risks include but not limited to bile leak, liver hematoma or failure to diagnose a condition. I did explain thoroughly to the patient that compliance with pre- and post op diet and other recommendations are integral part to improve the chances of successful weight loss and also not following it could end with serious health complications.      We discussed that our goal is to ameliorate the medical problems and not to obtain a specific body mass index. Patient understands the risks and benefits and wishes to proceed with the procedure. Also understands if BMI is lower than 40 without significant co morbid conditions, concerns for risks of surgery being somewhat higher over long run, however patient wants to proceed and fully understands the risks. Clearly BMI over 35 does impose very serious health risks as well chances of losing weight on diet only is very limited and sustaining weight loss is even less, thus surgery is certainly recommended for long term weight loss and better health overall given compliance. I advised the patient that we can't guarantee final insurance approval.      Lisa Be understands that there may be a need to perform other urgent or necessary procedures that were unanticipated. [facility] consent to the performance of any additional procedures determined during the original procedure to be in their best interests and where delay might cause additional harm or put patient at risk for additional anesthesia risk for required by a second procedure and that will be determined by the surgeon. [facility] is aware if Weight gain occurs in pre-op period while on pre-operative diet or  non compliant with it , surgery will be canceled. Lisa Be was satisfied with the discussion we had and Lisa Be had no further questions at end of visit and wants to proceed with surgery. 1- Pre-operative work up ordered for you(labs/x-rays/EKG). 2- Stop taking Blood thinners, Aspirin , Advil/Aleve/ Ibuprofen one week before surgery. 3- F/U with PCP for pre-op Medical Clearance. 4- Plan for Laparoscopic Sleeve, possible liver biopsy. Patient advised that its their responsibility to follow up for studies, referrals and/or labs ordered today.

## 2019-06-08 ASSESSMENT — ENCOUNTER SYMPTOMS
EYES NEGATIVE: 1
ALLERGIC/IMMUNOLOGIC NEGATIVE: 1
RESPIRATORY NEGATIVE: 1

## 2019-06-08 NOTE — PROGRESS NOTES
Maternal Grandfather     High Cholesterol Maternal Grandfather     Mental Illness Maternal Grandfather     Obesity Maternal Grandfather      Social History     Tobacco Use    Smoking status: Never Smoker    Smokeless tobacco: Never Used   Substance Use Topics    Alcohol use: Yes     Comment: rare     I counseled the patient on the importance of not smoking and risks of ETOH. No Known Allergies  Vitals:    06/13/19 1335   BP: 128/68   Site: Left Wrist   Position: Sitting   Pulse: 73   Resp: 18   SpO2: 98%   Weight: (!) 339 lb (153.8 kg)   Height: 5' 9\" (1.753 m)     Body mass index is 50.06 kg/m².     Current Outpatient Medications:     omeprazole (PRILOSEC) 20 MG delayed release capsule, Take 1 capsule by mouth Daily, Disp: 30 capsule, Rfl: 3    DULoxetine (CYMBALTA) 60 MG extended release capsule, Take 60 mg by mouth daily, Disp: , Rfl:     Lab Results   Component Value Date    WBC 8.7 03/04/2019    RBC 4.80 03/04/2019    HGB 13.1 03/04/2019    HCT 39.9 03/04/2019    MCV 83.1 03/04/2019    MCH 27.4 03/04/2019    MCHC 32.9 03/04/2019    MPV 8.3 03/04/2019    NEUTOPHILPCT 57.0 03/04/2019    LYMPHOPCT 35.0 03/04/2019    MONOPCT 3.0 03/04/2019    EOSRELPCT 4.0 03/04/2019    BASOPCT 0.0 03/04/2019    NEUTROABS 5.0 03/04/2019    LYMPHSABS 3.0 03/04/2019    MONOSABS 0.3 03/04/2019    EOSABS 0.3 03/04/2019     Lab Results   Component Value Date     03/04/2019    K 3.9 03/04/2019     03/04/2019    CO2 26 03/04/2019    ANIONGAP 14 03/04/2019    GLUCOSE 93 03/04/2019    BUN 11 03/04/2019    CREATININE 0.7 03/04/2019    LABGLOM >60 03/04/2019    GFRAA >60 03/04/2019    CALCIUM 9.8 03/04/2019    PROT 7.6 03/04/2019    LABALBU 4.2 03/04/2019    AGRATIO 1.2 03/04/2019    BILITOT <0.2 03/04/2019    ALKPHOS 97 03/04/2019    ALT 19 03/04/2019    AST 13 03/04/2019    GLOB 3.4 03/04/2019     Lab Results   Component Value Date    CHOL 219 03/04/2019    TRIG 129 03/04/2019    HDL 36 03/04/2019    LDLCALC 157 03/04/2019    LABVLDL 26 03/04/2019     Lab Results   Component Value Date    TSHREFLEX 3.19 03/04/2019     Lab Results   Component Value Date    IRON 36 03/04/2019    TIBC 305 03/04/2019    LABIRON 12 03/04/2019     Lab Results   Component Value Date    XUATFGVM65 450 03/04/2019    FOLATE 6.03 03/04/2019     Lab Results   Component Value Date    VITD25 9.3 03/04/2019     Lab Results   Component Value Date    LABA1C 5.6 03/04/2019    .0 03/04/2019     Review of Systems   Constitutional: Negative. Negative for chills and fever. HENT: Negative. Eyes: Negative. Respiratory: Negative. Negative for cough and shortness of breath. Cardiovascular: Negative. Gastrointestinal: Positive for constipation. Negative for abdominal pain, diarrhea and nausea. Intermittently   Endocrine: Negative. Genitourinary: Negative. Musculoskeletal: Negative. Skin: Negative. Allergic/Immunologic: Negative. Neurological: Negative. Hematological: Negative. Psychiatric/Behavioral: Negative. Objective:     Physical Exam   Constitutional: He is oriented to person, place, and time. He appears well-developed and well-nourished. HENT:   Head: Normocephalic and atraumatic. Eyes: Pupils are equal, round, and reactive to light. Conjunctivae are normal.   Neck: Normal range of motion. Neck supple. Cardiovascular: Normal rate and normal heart sounds. Pulmonary/Chest: Effort normal and breath sounds normal. No stridor. No respiratory distress. He has no wheezes. Abdominal: Soft. Bowel sounds are normal. He exhibits no distension. There is no tenderness. There is no guarding. Musculoskeletal: Normal range of motion. Neurological: He is alert and oriented to person, place, and time. Skin: Skin is warm and dry. Psychiatric: He has a normal mood and affect. His behavior is normal. Judgment and thought content normal.   Vitals reviewed.     Assessment and Plan:   Patient is here for their preoperative education group visit for sleeve gastrectomy. The patient is down 4.2 lbs today. The patient's current Body mass index is 50.06 kg/m². (6/13/19). He is making good dietary and behavior modifications and is considered to be a good surgical candidate. Patient has a diagnosis of hyperlipidemia. Discussed the benefits of weight loss and dietary changes on lipids and cholesterol. Discussed the fact that they will be required to crush or open their medications for the first two weeks after surgery and reviewed those medications that can not be crushed. Patient has a diagnosis of obstructive sleep apnea and uses a CPAP for sleep. Discussed that weight loss can assist with improving sleep apnea symptoms. Explained to patient to make sure they bring their CPAP with them to the hospital for their surgery. Patient has a diagnosis of chronic GERD and takes a PPI. Discussed the benefits of weight loss and dietary changes on acid reflux. However, they will most likely need to continue their medication short term after surgery as they adjust to the new diet. Discussed the fact that they will be required to crush or open their medications for the first two weeks after surgery and reviewed those medications that can not be crushed. Patient has a diagnosis of fatty liver. Discussed the benefits of weight loss and dietary changes on a fatty liver. In addition, the preoperative diet is designed to help shrink the liver to make it easier for the surgeon to work on the stomach. Patient received instructions from the registered dietitian in reference to the two week preoperative diet and the four phases of their postoperative diet. In addition I reviewed these instructions and stressed the importance of following these recommendations for their safety.      Patient completed the preoperative class where they were provided with education related to their bariatric surgery, common surgical complications,

## 2019-06-10 ENCOUNTER — TELEPHONE (OUTPATIENT)
Dept: BARIATRICS/WEIGHT MGMT | Age: 24
End: 2019-06-10

## 2019-06-10 NOTE — TELEPHONE ENCOUNTER
[employer] approval for sleeve and poss liver bx submitted  CPT [id number] & poss [id number]    DOS 7/8/19      [facility]

## 2019-06-11 NOTE — PROGRESS NOTES
Patient Name:   Yeimi Batista       Type of Surgery:  Sleeve         Date of Surgery:  7/8/19        Start Pre-Op Diet On:  6/25/19       Start Clear Liquids On:  7/7/19        If you are having a gastric bypass, start Bowel Prep between 2-4pm the day prior to surgery. NPO after midnight the night before your surgery! Take morning medications with a small amount of water.     NOTES:_______________________________________  ______________________________________________

## 2019-06-12 NOTE — TELEPHONE ENCOUNTER
Per Arnaldo Crisostomo @ [facility]   Approval # [id number]   CPT 31646   1 day   Valid 6/11/19-12/11/19    [id number]/ NPR  [phone]    [facility] when secondary

## 2019-06-13 ENCOUNTER — OFFICE VISIT (OUTPATIENT)
Dept: BARIATRICS/WEIGHT MGMT | Age: 24
End: 2019-06-13
Payer: COMMERCIAL

## 2019-06-13 VITALS
DIASTOLIC BLOOD PRESSURE: 68 MMHG | WEIGHT: 315 LBS | HEIGHT: 69 IN | HEART RATE: 73 BPM | BODY MASS INDEX: 46.65 KG/M2 | OXYGEN SATURATION: 98 % | RESPIRATION RATE: 18 BRPM | SYSTOLIC BLOOD PRESSURE: 128 MMHG

## 2019-06-13 DIAGNOSIS — G47.33 OBSTRUCTIVE SLEEP APNEA: Primary | ICD-10-CM

## 2019-06-13 DIAGNOSIS — K76.0 FATTY LIVER: ICD-10-CM

## 2019-06-13 DIAGNOSIS — K21.9 CHRONIC GERD: ICD-10-CM

## 2019-06-13 DIAGNOSIS — E78.2 HYPERLIPIDEMIA, MIXED: ICD-10-CM

## 2019-06-13 DIAGNOSIS — E66.01 MORBID OBESITY WITH BMI OF 50.0-59.9, ADULT (HCC): ICD-10-CM

## 2019-06-13 PROCEDURE — G8427 DOCREV CUR MEDS BY ELIG CLIN: HCPCS | Performed by: NURSE PRACTITIONER

## 2019-06-13 PROCEDURE — G8417 CALC BMI ABV UP PARAM F/U: HCPCS | Performed by: NURSE PRACTITIONER

## 2019-06-13 PROCEDURE — 1036F TOBACCO NON-USER: CPT | Performed by: NURSE PRACTITIONER

## 2019-06-13 PROCEDURE — 99213 OFFICE O/P EST LOW 20 MIN: CPT | Performed by: NURSE PRACTITIONER

## 2019-06-13 ASSESSMENT — ENCOUNTER SYMPTOMS
NAUSEA: 0
DIARRHEA: 0
SHORTNESS OF BREATH: 0
ABDOMINAL PAIN: 0
COUGH: 0
CONSTIPATION: 1

## 2019-06-13 NOTE — PROGRESS NOTES
Gordon lost 4.2 lbs over the past week. After dietary evaluation and education, patient is considered to be a good surgical candidate from a dietary perspective. Patient was educated on gastric sleeve dietary protocol. Pre-operative and post-operative diet guidelines were discussed and written information was provided. Nectar and Unjury protein supplements were purchased. Vitamin regimen with Bariatric Fusion vitamins was explained, and patient purchased a 1 month supply at this visit. Importance of vitamin compliance was discussed and potential of vitamin deficiencies was reviewed. Encouraged continued physical activity. Patient signed agreement stating they are committed to lifelong follow up, smoking and alcohol cessation, vitamin compliance, and 2 week pre-operative dietary protocol.

## 2019-06-24 ENCOUNTER — HOSPITAL ENCOUNTER (OUTPATIENT)
Age: 24
Discharge: HOME | End: 2019-06-24
Payer: COMMERCIAL

## 2019-06-24 DIAGNOSIS — Z01.818 PREOP TESTING: ICD-10-CM

## 2019-06-24 LAB
A/G RATIO: 1.5 (ref 1.1–2.2)
ABO/RH: NORMAL
ALBUMIN SERPL-MCNC: 4.5 G/DL (ref 3.4–5)
ALP BLD-CCNC: 90 U/L (ref 40–129)
ALT SERPL-CCNC: 18 U/L (ref 10–40)
ANION GAP SERPL CALCULATED.3IONS-SCNC: 16 MMOL/L (ref 3–16)
ANTIBODY SCREEN: NORMAL
AST SERPL-CCNC: 13 U/L (ref 15–37)
BILIRUB SERPL-MCNC: 0.4 MG/DL (ref 0–1)
BUN BLDV-MCNC: 13 MG/DL (ref 7–20)
CALCIUM SERPL-MCNC: 9.6 MG/DL (ref 8.3–10.6)
CHLORIDE BLD-SCNC: 105 MMOL/L (ref 99–110)
CO2: 22 MMOL/L (ref 21–32)
CREAT SERPL-MCNC: 0.8 MG/DL (ref 0.9–1.3)
EKG ATRIAL RATE: 49 BPM
EKG DIAGNOSIS: NORMAL
EKG P AXIS: -29 DEGREES
EKG P-R INTERVAL: 154 MS
EKG Q-T INTERVAL: 436 MS
EKG QRS DURATION: 96 MS
EKG QTC CALCULATION (BAZETT): 393 MS
EKG R AXIS: 23 DEGREES
EKG T AXIS: 15 DEGREES
EKG VENTRICULAR RATE: 49 BPM
GFR AFRICAN AMERICAN: >60
GFR NON-AFRICAN AMERICAN: >60
GLOBULIN: 3.1 G/DL
GLUCOSE BLD-MCNC: 92 MG/DL (ref 70–99)
HCT VFR BLD CALC: 40.4 % (ref 40.5–52.5)
HEMOGLOBIN: 13 G/DL (ref 13.5–17.5)
MCH RBC QN AUTO: 26.9 PG (ref 26–34)
MCHC RBC AUTO-ENTMCNC: 32.2 G/DL (ref 31–36)
MCV RBC AUTO: 83.6 FL (ref 80–100)
PDW BLD-RTO: 15 % (ref 12.4–15.4)
PLATELET # BLD: 326 K/UL (ref 135–450)
PMV BLD AUTO: 8.8 FL (ref 5–10.5)
POTASSIUM SERPL-SCNC: 4.4 MMOL/L (ref 3.5–5.1)
RBC # BLD: 4.83 M/UL (ref 4.2–5.9)
SODIUM BLD-SCNC: 143 MMOL/L (ref 136–145)
TOTAL PROTEIN: 7.6 G/DL (ref 6.4–8.2)
WBC # BLD: 9 K/UL (ref 4–11)

## 2019-06-24 PROCEDURE — 93010 ELECTROCARDIOGRAM REPORT: CPT | Performed by: INTERNAL MEDICINE

## 2019-06-24 PROCEDURE — 93005 ELECTROCARDIOGRAM TRACING: CPT | Performed by: SURGERY

## 2019-06-26 ENCOUNTER — TELEPHONE (OUTPATIENT)
Dept: BARIATRICS/WEIGHT MGMT | Age: 24
End: 2019-06-26

## 2019-06-26 NOTE — TELEPHONE ENCOUNTER
RD called pt back, has questions about pre-op diet that he started 6/25/19 (yesterday). 1. Can he add HB eggs and parmesan cheese to salads? No  2. What dressing can he use for his salad? Any fat free dressing  3. Can he have cheese? - No   4. Can he have chicken and roast beef? - No    Reviewed 6 food options with pt that he can have, and encouraged pt to follow that exactly. Pt reports he still has information available to reference to. Encouraged pt to call back with any additional questions. Pt verbalized understanding.

## 2019-07-01 ENCOUNTER — TELEPHONE (OUTPATIENT)
Dept: BARIATRICS/WEIGHT MGMT | Age: 24
End: 2019-07-01

## 2019-07-08 ENCOUNTER — HOSPITAL ENCOUNTER (INPATIENT)
Age: 24
LOS: 1 days | Discharge: HOME | DRG: 621 | End: 2019-07-09
Attending: SURGERY | Admitting: SURGERY
Payer: COMMERCIAL

## 2019-07-08 ENCOUNTER — ANESTHESIA EVENT (OUTPATIENT)
Dept: OPERATING ROOM | Age: 24
DRG: 621 | End: 2019-07-08
Payer: COMMERCIAL

## 2019-07-08 ENCOUNTER — ANESTHESIA (OUTPATIENT)
Dept: OPERATING ROOM | Age: 24
DRG: 621 | End: 2019-07-08
Payer: COMMERCIAL

## 2019-07-08 VITALS
DIASTOLIC BLOOD PRESSURE: 63 MMHG | RESPIRATION RATE: 17 BRPM | OXYGEN SATURATION: 98 % | SYSTOLIC BLOOD PRESSURE: 130 MMHG | TEMPERATURE: 96.6 F

## 2019-07-08 DIAGNOSIS — Z98.84 S/P LAPAROSCOPIC SLEEVE GASTRECTOMY: ICD-10-CM

## 2019-07-08 DIAGNOSIS — Z01.818 PREOP TESTING: Primary | ICD-10-CM

## 2019-07-08 PROBLEM — E66.01 MORBID OBESITY WITH BMI OF 45.0-49.9, ADULT (HCC): Status: ACTIVE | Noted: 2019-07-08

## 2019-07-08 LAB
ABO/RH: NORMAL
ANTIBODY SCREEN: NORMAL
ANTIBODY SCREEN: NORMAL
GLUCOSE BLD-MCNC: 90 MG/DL (ref 70–99)
PERFORMED ON: NORMAL

## 2019-07-08 PROCEDURE — 2720000010 HC SURG SUPPLY STERILE: Performed by: SURGERY

## 2019-07-08 PROCEDURE — 6360000002 HC RX W HCPCS: Performed by: SURGERY

## 2019-07-08 PROCEDURE — 7100000001 HC PACU RECOVERY - ADDTL 15 MIN: Performed by: SURGERY

## 2019-07-08 PROCEDURE — 3600000015 HC SURGERY LEVEL 5 ADDTL 15MIN: Performed by: SURGERY

## 2019-07-08 PROCEDURE — 2580000003 HC RX 258: Performed by: SURGERY

## 2019-07-08 PROCEDURE — 6370000000 HC RX 637 (ALT 250 FOR IP): Performed by: SURGERY

## 2019-07-08 PROCEDURE — 3700000000 HC ANESTHESIA ATTENDED CARE: Performed by: SURGERY

## 2019-07-08 PROCEDURE — 86900 BLOOD TYPING SEROLOGIC ABO: CPT

## 2019-07-08 PROCEDURE — 6360000002 HC RX W HCPCS: Performed by: NURSE ANESTHETIST, CERTIFIED REGISTERED

## 2019-07-08 PROCEDURE — 2500000003 HC RX 250 WO HCPCS: Performed by: NURSE ANESTHETIST, CERTIFIED REGISTERED

## 2019-07-08 PROCEDURE — 86901 BLOOD TYPING SEROLOGIC RH(D): CPT

## 2019-07-08 PROCEDURE — 88307 TISSUE EXAM BY PATHOLOGIST: CPT

## 2019-07-08 PROCEDURE — C9113 INJ PANTOPRAZOLE SODIUM, VIA: HCPCS | Performed by: SURGERY

## 2019-07-08 PROCEDURE — 6370000000 HC RX 637 (ALT 250 FOR IP)

## 2019-07-08 PROCEDURE — 3700000001 HC ADD 15 MINUTES (ANESTHESIA): Performed by: SURGERY

## 2019-07-08 PROCEDURE — 2709999900 HC NON-CHARGEABLE SUPPLY: Performed by: SURGERY

## 2019-07-08 PROCEDURE — 2500000003 HC RX 250 WO HCPCS: Performed by: SURGERY

## 2019-07-08 PROCEDURE — 3600000005 HC SURGERY LEVEL 5 BASE: Performed by: SURGERY

## 2019-07-08 PROCEDURE — 6370000000 HC RX 637 (ALT 250 FOR IP): Performed by: NURSE ANESTHETIST, CERTIFIED REGISTERED

## 2019-07-08 PROCEDURE — 86850 RBC ANTIBODY SCREEN: CPT

## 2019-07-08 PROCEDURE — 94770 HC ETCO2 MONITOR DAILY: CPT

## 2019-07-08 PROCEDURE — 1200000000 HC SEMI PRIVATE

## 2019-07-08 PROCEDURE — 43775 LAP SLEEVE GASTRECTOMY: CPT | Performed by: SURGERY

## 2019-07-08 PROCEDURE — 2700000000 HC OXYGEN THERAPY PER DAY

## 2019-07-08 PROCEDURE — 36415 COLL VENOUS BLD VENIPUNCTURE: CPT

## 2019-07-08 PROCEDURE — 94150 VITAL CAPACITY TEST: CPT

## 2019-07-08 PROCEDURE — 6360000002 HC RX W HCPCS: Performed by: ANESTHESIOLOGY

## 2019-07-08 PROCEDURE — 6360000002 HC RX W HCPCS

## 2019-07-08 PROCEDURE — 0DB64Z3 EXCISION OF STOMACH, PERCUTANEOUS ENDOSCOPIC APPROACH, VERTICAL: ICD-10-PCS | Performed by: SURGERY

## 2019-07-08 PROCEDURE — 94760 N-INVAS EAR/PLS OXIMETRY 1: CPT

## 2019-07-08 PROCEDURE — 7100000000 HC PACU RECOVERY - FIRST 15 MIN: Performed by: SURGERY

## 2019-07-08 RX ORDER — MEPERIDINE HYDROCHLORIDE 25 MG/ML
12.5 INJECTION INTRAMUSCULAR; INTRAVENOUS; SUBCUTANEOUS EVERY 5 MIN PRN
Status: DISCONTINUED | OUTPATIENT
Start: 2019-07-08 | End: 2019-07-08

## 2019-07-08 RX ORDER — FENTANYL CITRATE 50 UG/ML
INJECTION, SOLUTION INTRAMUSCULAR; INTRAVENOUS PRN
Status: DISCONTINUED | OUTPATIENT
Start: 2019-07-08 | End: 2019-07-08 | Stop reason: SDUPTHER

## 2019-07-08 RX ORDER — OXYCODONE HYDROCHLORIDE AND ACETAMINOPHEN 5; 325 MG/1; MG/1
1 TABLET ORAL
Status: DISCONTINUED | OUTPATIENT
Start: 2019-07-08 | End: 2019-07-08

## 2019-07-08 RX ORDER — SODIUM CHLORIDE, SODIUM LACTATE, POTASSIUM CHLORIDE, CALCIUM CHLORIDE 600; 310; 30; 20 MG/100ML; MG/100ML; MG/100ML; MG/100ML
INJECTION, SOLUTION INTRAVENOUS CONTINUOUS
Status: DISCONTINUED | OUTPATIENT
Start: 2019-07-08 | End: 2019-07-09 | Stop reason: HOSPADM

## 2019-07-08 RX ORDER — LIDOCAINE HYDROCHLORIDE 10 MG/ML
0.5 INJECTION, SOLUTION EPIDURAL; INFILTRATION; INTRACAUDAL; PERINEURAL ONCE
Status: DISCONTINUED | OUTPATIENT
Start: 2019-07-08 | End: 2019-07-08

## 2019-07-08 RX ORDER — SODIUM CHLORIDE 0.9 % (FLUSH) 0.9 %
10 SYRINGE (ML) INJECTION EVERY 12 HOURS SCHEDULED
Status: DISCONTINUED | OUTPATIENT
Start: 2019-07-08 | End: 2019-07-08

## 2019-07-08 RX ORDER — DEXAMETHASONE SODIUM PHOSPHATE 4 MG/ML
INJECTION, SOLUTION INTRA-ARTICULAR; INTRALESIONAL; INTRAMUSCULAR; INTRAVENOUS; SOFT TISSUE PRN
Status: DISCONTINUED | OUTPATIENT
Start: 2019-07-08 | End: 2019-07-08 | Stop reason: SDUPTHER

## 2019-07-08 RX ORDER — SCOLOPAMINE TRANSDERMAL SYSTEM 1 MG/1
1 PATCH, EXTENDED RELEASE TRANSDERMAL
Status: DISCONTINUED | OUTPATIENT
Start: 2019-07-08 | End: 2019-07-09 | Stop reason: HOSPADM

## 2019-07-08 RX ORDER — SODIUM CHLORIDE 0.9 % (FLUSH) 0.9 %
10 SYRINGE (ML) INJECTION PRN
Status: DISCONTINUED | OUTPATIENT
Start: 2019-07-08 | End: 2019-07-08

## 2019-07-08 RX ORDER — MIDAZOLAM HYDROCHLORIDE 1 MG/ML
INJECTION INTRAMUSCULAR; INTRAVENOUS PRN
Status: DISCONTINUED | OUTPATIENT
Start: 2019-07-08 | End: 2019-07-08 | Stop reason: SDUPTHER

## 2019-07-08 RX ORDER — LABETALOL HYDROCHLORIDE 5 MG/ML
10 INJECTION, SOLUTION INTRAVENOUS
Status: DISCONTINUED | OUTPATIENT
Start: 2019-07-08 | End: 2019-07-09 | Stop reason: HOSPADM

## 2019-07-08 RX ORDER — BUPIVACAINE HYDROCHLORIDE AND EPINEPHRINE 2.5; 5 MG/ML; UG/ML
INJECTION, SOLUTION EPIDURAL; INFILTRATION; INTRACAUDAL; PERINEURAL
Status: COMPLETED | OUTPATIENT
Start: 2019-07-08 | End: 2019-07-08

## 2019-07-08 RX ORDER — METHYLENE BLUE 10 MG/ML
INJECTION INTRAVENOUS
Status: COMPLETED | OUTPATIENT
Start: 2019-07-08 | End: 2019-07-08

## 2019-07-08 RX ORDER — MAGNESIUM SULFATE HEPTAHYDRATE 500 MG/ML
INJECTION, SOLUTION INTRAMUSCULAR; INTRAVENOUS PRN
Status: DISCONTINUED | OUTPATIENT
Start: 2019-07-08 | End: 2019-07-08 | Stop reason: SDUPTHER

## 2019-07-08 RX ORDER — SUCCINYLCHOLINE/SOD CL,ISO/PF 200MG/10ML
SYRINGE (ML) INTRAVENOUS PRN
Status: DISCONTINUED | OUTPATIENT
Start: 2019-07-08 | End: 2019-07-08 | Stop reason: SDUPTHER

## 2019-07-08 RX ORDER — PROPOFOL 10 MG/ML
INJECTION, EMULSION INTRAVENOUS PRN
Status: DISCONTINUED | OUTPATIENT
Start: 2019-07-08 | End: 2019-07-08 | Stop reason: SDUPTHER

## 2019-07-08 RX ORDER — LABETALOL HYDROCHLORIDE 5 MG/ML
5 INJECTION, SOLUTION INTRAVENOUS EVERY 10 MIN PRN
Status: DISCONTINUED | OUTPATIENT
Start: 2019-07-08 | End: 2019-07-08

## 2019-07-08 RX ORDER — SODIUM CHLORIDE, SODIUM LACTATE, POTASSIUM CHLORIDE, CALCIUM CHLORIDE 600; 310; 30; 20 MG/100ML; MG/100ML; MG/100ML; MG/100ML
INJECTION, SOLUTION INTRAVENOUS CONTINUOUS
Status: DISCONTINUED | OUTPATIENT
Start: 2019-07-08 | End: 2019-07-08

## 2019-07-08 RX ORDER — HYDRALAZINE HYDROCHLORIDE 20 MG/ML
5 INJECTION INTRAMUSCULAR; INTRAVENOUS EVERY 10 MIN PRN
Status: DISCONTINUED | OUTPATIENT
Start: 2019-07-08 | End: 2019-07-08

## 2019-07-08 RX ORDER — NALOXONE HYDROCHLORIDE 1 MG/ML
0.4 INJECTION INTRAMUSCULAR; INTRAVENOUS; SUBCUTANEOUS PRN
Status: DISCONTINUED | OUTPATIENT
Start: 2019-07-08 | End: 2019-07-09

## 2019-07-08 RX ORDER — PANTOPRAZOLE SODIUM 40 MG/10ML
40 INJECTION, POWDER, LYOPHILIZED, FOR SOLUTION INTRAVENOUS DAILY
Status: DISCONTINUED | OUTPATIENT
Start: 2019-07-08 | End: 2019-07-09 | Stop reason: HOSPADM

## 2019-07-08 RX ORDER — ACETAMINOPHEN 10 MG/ML
INJECTION, SOLUTION INTRAVENOUS
Status: COMPLETED
Start: 2019-07-08 | End: 2019-07-08

## 2019-07-08 RX ORDER — MAGNESIUM HYDROXIDE 1200 MG/15ML
LIQUID ORAL CONTINUOUS PRN
Status: COMPLETED | OUTPATIENT
Start: 2019-07-08 | End: 2019-07-08

## 2019-07-08 RX ORDER — SODIUM CHLORIDE 0.9 % (FLUSH) 0.9 %
10 SYRINGE (ML) INJECTION EVERY 12 HOURS SCHEDULED
Status: DISCONTINUED | OUTPATIENT
Start: 2019-07-08 | End: 2019-07-09 | Stop reason: HOSPADM

## 2019-07-08 RX ORDER — DIPHENHYDRAMINE HYDROCHLORIDE 50 MG/ML
12.5 INJECTION INTRAMUSCULAR; INTRAVENOUS EVERY 6 HOURS PRN
Status: DISCONTINUED | OUTPATIENT
Start: 2019-07-08 | End: 2019-07-09 | Stop reason: HOSPADM

## 2019-07-08 RX ORDER — SODIUM CHLORIDE 0.9 % (FLUSH) 0.9 %
10 SYRINGE (ML) INJECTION PRN
Status: DISCONTINUED | OUTPATIENT
Start: 2019-07-08 | End: 2019-07-09 | Stop reason: HOSPADM

## 2019-07-08 RX ORDER — ONDANSETRON 2 MG/ML
4 INJECTION INTRAMUSCULAR; INTRAVENOUS
Status: COMPLETED | OUTPATIENT
Start: 2019-07-08 | End: 2019-07-08

## 2019-07-08 RX ORDER — LIDOCAINE HYDROCHLORIDE 10 MG/ML
1 INJECTION, SOLUTION EPIDURAL; INFILTRATION; INTRACAUDAL; PERINEURAL
Status: DISCONTINUED | OUTPATIENT
Start: 2019-07-08 | End: 2019-07-08

## 2019-07-08 RX ORDER — LIDOCAINE 4 G/G
1 PATCH TOPICAL DAILY
Status: DISCONTINUED | OUTPATIENT
Start: 2019-07-08 | End: 2019-07-09 | Stop reason: HOSPADM

## 2019-07-08 RX ORDER — METOCLOPRAMIDE HYDROCHLORIDE 5 MG/ML
10 INJECTION INTRAMUSCULAR; INTRAVENOUS EVERY 6 HOURS PRN
Status: DISCONTINUED | OUTPATIENT
Start: 2019-07-08 | End: 2019-07-09 | Stop reason: HOSPADM

## 2019-07-08 RX ORDER — APREPITANT 80 MG/1
80 CAPSULE ORAL ONCE
Status: COMPLETED | OUTPATIENT
Start: 2019-07-08 | End: 2019-07-08

## 2019-07-08 RX ORDER — ONDANSETRON 2 MG/ML
4 INJECTION INTRAMUSCULAR; INTRAVENOUS EVERY 6 HOURS PRN
Status: DISCONTINUED | OUTPATIENT
Start: 2019-07-08 | End: 2019-07-09 | Stop reason: HOSPADM

## 2019-07-08 RX ORDER — ONDANSETRON 2 MG/ML
INJECTION INTRAMUSCULAR; INTRAVENOUS PRN
Status: DISCONTINUED | OUTPATIENT
Start: 2019-07-08 | End: 2019-07-08 | Stop reason: SDUPTHER

## 2019-07-08 RX ORDER — GLYCOPYRROLATE 1 MG/5 ML
SYRINGE (ML) INTRAVENOUS PRN
Status: DISCONTINUED | OUTPATIENT
Start: 2019-07-08 | End: 2019-07-08 | Stop reason: SDUPTHER

## 2019-07-08 RX ORDER — HYDROMORPHONE HCL 110MG/55ML
0.5 PATIENT CONTROLLED ANALGESIA SYRINGE INTRAVENOUS
Status: DISCONTINUED | OUTPATIENT
Start: 2019-07-08 | End: 2019-07-09 | Stop reason: HOSPADM

## 2019-07-08 RX ORDER — LIDOCAINE HYDROCHLORIDE 40 MG/ML
SOLUTION TOPICAL PRN
Status: DISCONTINUED | OUTPATIENT
Start: 2019-07-08 | End: 2019-07-08 | Stop reason: SDUPTHER

## 2019-07-08 RX ORDER — 0.9 % SODIUM CHLORIDE 0.9 %
10 VIAL (ML) INJECTION DAILY
Status: DISCONTINUED | OUTPATIENT
Start: 2019-07-08 | End: 2019-07-09 | Stop reason: HOSPADM

## 2019-07-08 RX ORDER — SCOLOPAMINE TRANSDERMAL SYSTEM 1 MG/1
1 PATCH, EXTENDED RELEASE TRANSDERMAL ONCE
Status: DISCONTINUED | OUTPATIENT
Start: 2019-07-08 | End: 2019-07-08

## 2019-07-08 RX ORDER — PROMETHAZINE HYDROCHLORIDE 25 MG/ML
6.25 INJECTION, SOLUTION INTRAMUSCULAR; INTRAVENOUS EVERY 6 HOURS PRN
Status: DISCONTINUED | OUTPATIENT
Start: 2019-07-08 | End: 2019-07-09 | Stop reason: HOSPADM

## 2019-07-08 RX ORDER — ROCURONIUM BROMIDE 10 MG/ML
INJECTION, SOLUTION INTRAVENOUS PRN
Status: DISCONTINUED | OUTPATIENT
Start: 2019-07-08 | End: 2019-07-08 | Stop reason: SDUPTHER

## 2019-07-08 RX ORDER — HYDRALAZINE HYDROCHLORIDE 20 MG/ML
10 INJECTION INTRAMUSCULAR; INTRAVENOUS
Status: DISCONTINUED | OUTPATIENT
Start: 2019-07-08 | End: 2019-07-09 | Stop reason: HOSPADM

## 2019-07-08 RX ORDER — NEOSTIGMINE METHYLSULFATE 5 MG/5 ML
SYRINGE (ML) INTRAVENOUS PRN
Status: DISCONTINUED | OUTPATIENT
Start: 2019-07-08 | End: 2019-07-08 | Stop reason: SDUPTHER

## 2019-07-08 RX ORDER — ACETAMINOPHEN 10 MG/ML
1000 INJECTION, SOLUTION INTRAVENOUS EVERY 8 HOURS
Status: COMPLETED | OUTPATIENT
Start: 2019-07-08 | End: 2019-07-09

## 2019-07-08 RX ORDER — HYDROMORPHONE HCL 110MG/55ML
0.5 PATIENT CONTROLLED ANALGESIA SYRINGE INTRAVENOUS EVERY 5 MIN PRN
Status: DISCONTINUED | OUTPATIENT
Start: 2019-07-08 | End: 2019-07-08

## 2019-07-08 RX ADMIN — ENOXAPARIN SODIUM 40 MG: 40 INJECTION SUBCUTANEOUS at 11:58

## 2019-07-08 RX ADMIN — FENTANYL CITRATE 50 MCG: 50 INJECTION, SOLUTION INTRAMUSCULAR; INTRAVENOUS at 12:46

## 2019-07-08 RX ADMIN — FENTANYL CITRATE 50 MCG: 50 INJECTION, SOLUTION INTRAMUSCULAR; INTRAVENOUS at 12:52

## 2019-07-08 RX ADMIN — ACETAMINOPHEN 1000 MG: 10 INJECTION, SOLUTION INTRAVENOUS at 23:53

## 2019-07-08 RX ADMIN — Medication 200 MG: at 12:52

## 2019-07-08 RX ADMIN — PROPOFOL 200 MG: 10 INJECTION, EMULSION INTRAVENOUS at 12:52

## 2019-07-08 RX ADMIN — Medication 5 MG: at 14:28

## 2019-07-08 RX ADMIN — DEXAMETHASONE SODIUM PHOSPHATE 10 MG: 4 INJECTION, SOLUTION INTRAMUSCULAR; INTRAVENOUS at 12:56

## 2019-07-08 RX ADMIN — ROCURONIUM BROMIDE 10 MG: 10 INJECTION, SOLUTION INTRAVENOUS at 12:52

## 2019-07-08 RX ADMIN — METOCLOPRAMIDE 10 MG: 5 INJECTION, SOLUTION INTRAMUSCULAR; INTRAVENOUS at 23:00

## 2019-07-08 RX ADMIN — ACETAMINOPHEN 1000 MG: 10 INJECTION, SOLUTION INTRAVENOUS at 14:58

## 2019-07-08 RX ADMIN — APREPITANT 80 MG: 80 CAPSULE ORAL at 11:59

## 2019-07-08 RX ADMIN — FENTANYL CITRATE 50 MCG: 50 INJECTION, SOLUTION INTRAMUSCULAR; INTRAVENOUS at 13:58

## 2019-07-08 RX ADMIN — MAGNESIUM SULFATE HEPTAHYDRATE 4 G: 500 INJECTION, SOLUTION INTRAMUSCULAR; INTRAVENOUS at 13:23

## 2019-07-08 RX ADMIN — SODIUM CHLORIDE, POTASSIUM CHLORIDE, SODIUM LACTATE AND CALCIUM CHLORIDE: 600; 310; 30; 20 INJECTION, SOLUTION INTRAVENOUS at 17:33

## 2019-07-08 RX ADMIN — ROCURONIUM BROMIDE 30 MG: 10 INJECTION, SOLUTION INTRAVENOUS at 13:16

## 2019-07-08 RX ADMIN — FENTANYL CITRATE 50 MCG: 50 INJECTION, SOLUTION INTRAMUSCULAR; INTRAVENOUS at 13:28

## 2019-07-08 RX ADMIN — ROCURONIUM BROMIDE 10 MG: 10 INJECTION, SOLUTION INTRAVENOUS at 13:58

## 2019-07-08 RX ADMIN — Medication 3 G: at 20:38

## 2019-07-08 RX ADMIN — LIDOCAINE HYDROCHLORIDE 100 ML: 40 SOLUTION TOPICAL at 12:52

## 2019-07-08 RX ADMIN — PANTOPRAZOLE SODIUM 40 MG: 40 INJECTION, POWDER, FOR SOLUTION INTRAVENOUS at 17:32

## 2019-07-08 RX ADMIN — SODIUM CHLORIDE, POTASSIUM CHLORIDE, SODIUM LACTATE AND CALCIUM CHLORIDE: 600; 310; 30; 20 INJECTION, SOLUTION INTRAVENOUS at 14:00

## 2019-07-08 RX ADMIN — Medication 0.6 MG: at 14:28

## 2019-07-08 RX ADMIN — MIDAZOLAM HYDROCHLORIDE 2 MG: 1 INJECTION, SOLUTION INTRAMUSCULAR; INTRAVENOUS at 12:46

## 2019-07-08 RX ADMIN — ONDANSETRON 4 MG: 2 INJECTION INTRAMUSCULAR; INTRAVENOUS at 15:29

## 2019-07-08 RX ADMIN — Medication 10 MG: at 14:58

## 2019-07-08 RX ADMIN — Medication 3 G: at 12:43

## 2019-07-08 RX ADMIN — Medication 10 ML: at 17:32

## 2019-07-08 RX ADMIN — Medication 0.2 MG: at 13:39

## 2019-07-08 RX ADMIN — ONDANSETRON 4 MG: 2 INJECTION INTRAMUSCULAR; INTRAVENOUS at 14:39

## 2019-07-08 RX ADMIN — SODIUM CHLORIDE, POTASSIUM CHLORIDE, SODIUM LACTATE AND CALCIUM CHLORIDE: 600; 310; 30; 20 INJECTION, SOLUTION INTRAVENOUS at 11:57

## 2019-07-08 ASSESSMENT — PULMONARY FUNCTION TESTS
PIF_VALUE: 30
PIF_VALUE: 31
PIF_VALUE: 26
PIF_VALUE: 31
PIF_VALUE: 27
PIF_VALUE: 33
PIF_VALUE: 2
PIF_VALUE: 31
PIF_VALUE: 1
PIF_VALUE: 3
PIF_VALUE: 28
PIF_VALUE: 34
PIF_VALUE: 2
PIF_VALUE: 36
PIF_VALUE: 25
PIF_VALUE: 27
PIF_VALUE: 30
PIF_VALUE: 31
PIF_VALUE: 25
PIF_VALUE: 30
PIF_VALUE: 26
PIF_VALUE: 31
PIF_VALUE: 28
PIF_VALUE: 34
PIF_VALUE: 27
PIF_VALUE: 26
PIF_VALUE: 27
PIF_VALUE: 27
PIF_VALUE: 32
PIF_VALUE: 30
PIF_VALUE: 0
PIF_VALUE: 32
PIF_VALUE: 26
PIF_VALUE: 32
PIF_VALUE: 27
PIF_VALUE: 31
PIF_VALUE: 27
PIF_VALUE: 28
PIF_VALUE: 30
PIF_VALUE: 22
PIF_VALUE: 28
PIF_VALUE: 27
PIF_VALUE: 27
PIF_VALUE: 32
PIF_VALUE: 30
PIF_VALUE: 27
PIF_VALUE: 31
PIF_VALUE: 30
PIF_VALUE: 1
PIF_VALUE: 30
PIF_VALUE: 35
PIF_VALUE: 1
PIF_VALUE: 31
PIF_VALUE: 27
PIF_VALUE: 2
PIF_VALUE: 31
PIF_VALUE: 1
PIF_VALUE: 32
PIF_VALUE: 27
PIF_VALUE: 33
PIF_VALUE: 27
PIF_VALUE: 27
PIF_VALUE: 29
PIF_VALUE: 30
PIF_VALUE: 4
PIF_VALUE: 34
PIF_VALUE: 25
PIF_VALUE: 27
PIF_VALUE: 33
PIF_VALUE: 30
PIF_VALUE: 30
PIF_VALUE: 31
PIF_VALUE: 25
PIF_VALUE: 27
PIF_VALUE: 33
PIF_VALUE: 27
PIF_VALUE: 30
PIF_VALUE: 27
PIF_VALUE: 28
PIF_VALUE: 27
PIF_VALUE: 31
PIF_VALUE: 30
PIF_VALUE: 31
PIF_VALUE: 30
PIF_VALUE: 31
PIF_VALUE: 31
PIF_VALUE: 22
PIF_VALUE: 4
PIF_VALUE: 31
PIF_VALUE: 4
PIF_VALUE: 29
PIF_VALUE: 22
PIF_VALUE: 30
PIF_VALUE: 31
PIF_VALUE: 27
PIF_VALUE: 31
PIF_VALUE: 35
PIF_VALUE: 31
PIF_VALUE: 31
PIF_VALUE: 25
PIF_VALUE: 24
PIF_VALUE: 24
PIF_VALUE: 27
PIF_VALUE: 32
PIF_VALUE: 31
PIF_VALUE: 31
PIF_VALUE: 27
PIF_VALUE: 30
PIF_VALUE: 1
PIF_VALUE: 32
PIF_VALUE: 22
PIF_VALUE: 27
PIF_VALUE: 3

## 2019-07-08 ASSESSMENT — PAIN SCALES - GENERAL
PAINLEVEL_OUTOF10: 3
PAINLEVEL_OUTOF10: 4
PAINLEVEL_OUTOF10: 5

## 2019-07-08 ASSESSMENT — PAIN DESCRIPTION - FREQUENCY: FREQUENCY: CONTINUOUS

## 2019-07-08 ASSESSMENT — PAIN DESCRIPTION - ORIENTATION: ORIENTATION: MID

## 2019-07-08 ASSESSMENT — PAIN DESCRIPTION - LOCATION: LOCATION: ABDOMEN

## 2019-07-08 ASSESSMENT — LIFESTYLE VARIABLES: SMOKING_STATUS: 0

## 2019-07-08 ASSESSMENT — PAIN DESCRIPTION - ONSET: ONSET: GRADUAL

## 2019-07-08 ASSESSMENT — PAIN - FUNCTIONAL ASSESSMENT: PAIN_FUNCTIONAL_ASSESSMENT: 0-10

## 2019-07-08 ASSESSMENT — PAIN DESCRIPTION - PAIN TYPE: TYPE: SURGICAL PAIN

## 2019-07-08 ASSESSMENT — PAIN DESCRIPTION - DESCRIPTORS: DESCRIPTORS: DISCOMFORT

## 2019-07-08 ASSESSMENT — PAIN DESCRIPTION - PROGRESSION: CLINICAL_PROGRESSION: GRADUALLY WORSENING

## 2019-07-08 NOTE — ANESTHESIA PRE PROCEDURE
Department of Anesthesiology  Preprocedure Note       Name:  Rm Leonardo   Age:  20 y.o.  :  1995                                          MRN:  [id number]         Date:  2019      Surgeon: Sudha Brown): Shahriar Finch MD    Procedure: LAPAROSCOPIC SLEEVE GASTRECTOMY - ETHICON (N/A Abdomen)  POSSIBLE LIVER BIOPSY (N/A Abdomen)    Medications prior to admission:   Prior to Admission medications    Medication Sig Start Date End Date Taking?  Authorizing Provider   omeprazole (PRILOSEC) 20 MG delayed release capsule Take 1 capsule by mouth Daily 19   Shahriar Finch MD   DULoxetine (CYMBALTA) 60 MG extended release capsule Take 60 mg by mouth daily    Historical Provider, MD       Current medications:    Current Facility-Administered Medications   Medication Dose Route Frequency Provider Last Rate Last Dose    lactated ringers infusion   Intravenous Continuous Shahriar Finch MD        lidocaine PF 1 % injection 0.5 mL  0.5 mL Intradermal Once Shahriar Finch MD        ceFAZolin (ANCEF) 3 g in dextrose 5 % 100 mL IVPB  3 g Intravenous On Call to Sonia Villarreal MD        aprepitant (EMEND) capsule 80 mg  80 mg Oral Once Shahriar Finch MD        enoxaparin (LOVENOX) injection 40 mg  40 mg Subcutaneous Once Shahriar Finch MD        scopolamine (TRANSDERM-SCOP) transdermal patch 1 patch  1 patch Transdermal Once Shahriar Finch MD        lactated ringers infusion   Intravenous Continuous Rosella Holstein, MD        sodium chloride flush 0.9 % injection 10 mL  10 mL Intravenous 2 times per day Rosella Holstein, MD        sodium chloride flush 0.9 % injection 10 mL  10 mL Intravenous PRN Rosella Holstein, MD        lidocaine PF 1 % injection 1 mL  1 mL Intradermal Once PRN Rosella Holstein, MD        HYDROmorphone (DILAUDID) injection 0.5 mg  0.5 mg Intravenous Q5 Min PRN Rosella Holstein, MD        oxyCODONE-acetaminophen (PERCOCET) 5-325 MG per tablet 1 tablet  1 tablet Oral Once PRN Cassie Mackay MD        ondansetron [facility] injection 4 mg  4 mg Intravenous Once PRN Cassie Mackay MD        labetalol (NORMODYNE;TRANDATE) injection 5 mg  5 mg Intravenous Q10 Min PRN Cassie Mackay MD        hydrALAZINE (APRESOLINE) injection 5 mg  5 mg Intravenous Q10 Min PRN Cassie Mackay MD        meperidine (DEMEROL) injection 12.5 mg  12.5 mg Intravenous Q5 Min PRN Cassie Mackay MD           Allergies:  No Known Allergies    Problem List:    Patient Active Problem List   Diagnosis Code    Bandemia D72.825    Obstructive sleep apnea G47.33    Morbid obesity with BMI of 50.0-59.9, adult (HCC) E66.01, Z68.43    Chronic GERD K21.9    Fatty liver K76.0    Iron deficiency E61.1    Vitamin D deficiency E55.9    Hyperlipidemia, mixed E78.2       Past Medical History:        Diagnosis Date    Anxiety     Asthma     Hypertension     no current problems    Migraines     Obesity     Reflux esophagitis     Sleep apnea        Past Surgical History:        Procedure Laterality Date    PLANTAR FASCIA SURGERY      TONSILLECTOMY      TYMPANOSTOMY TUBE PLACEMENT      UPPER GASTROINTESTINAL ENDOSCOPY N/A 4/19/2019    EGD BIOPSY performed by Chata Moran MD at [address] [facility] [address] History:    Social History     Tobacco Use    Smoking status: Never Smoker    Smokeless tobacco: Never Used   Substance Use Topics    Alcohol use: Yes     Comment: rare                                Counseling given: Not Answered      Vital Signs (Current):   Vitals:    06/10/19 1311 07/08/19 1112   BP:  137/78   Pulse:  71   Resp:  20   Temp:  97.5 °F (36.4 °C)   TempSrc:  Temporal   SpO2:  96%   Weight: (!) 345 lb (156.5 kg) (!) 327 lb (148.3 kg)   Height: 5' 10\" (1.778 m) 5' 10\" (1.778 m)                                              BP Readings from Last 3 Encounters:   07/08/19 137/78   06/13/19 128/68   06/07/19 126/62       NPO Status:

## 2019-07-08 NOTE — H&P
normal. No accessory muscle usage or stridor. No apnea. No respiratory distress. Cardiovascular: Normal rate and no JVD. Abdominal: Normal appearance. Patient exhibits no distension. Musculoskeletal: Normal range of motion. Patient exhibits no edema. Neurological: Patient is alert and oriented to person, place, and time. Patient has normal strength. GCS eye subscore is 4. GCS verbal subscore is 5. GCS motor subscore is 6. Skin: Skin is warm and dry. No abrasion and no rash noted. Patient is not diaphoretic. No cyanosis or erythema. Psychiatric: Patient has a normal mood and affect. Speech is normal and behavior is normal. Cognition and memory are normal.       DATA:  CBC:   Lab Results   Component Value Date    WBC 9.0 06/24/2019    RBC 4.83 06/24/2019    HGB 13.0 06/24/2019    HCT 40.4 06/24/2019    MCV 83.6 06/24/2019    MCH 26.9 06/24/2019    MCHC 32.2 06/24/2019    RDW 15.0 06/24/2019     06/24/2019    MPV 8.8 06/24/2019     CMP:    Lab Results   Component Value Date     06/24/2019    K 4.4 06/24/2019     06/24/2019    CO2 22 06/24/2019    BUN 13 06/24/2019    CREATININE 0.8 06/24/2019    GFRAA >60 06/24/2019    AGRATIO 1.5 06/24/2019    LABGLOM >60 06/24/2019    GLUCOSE 92 06/24/2019    PROT 7.6 06/24/2019    LABALBU 4.5 06/24/2019    CALCIUM 9.6 06/24/2019    BILITOT 0.4 06/24/2019    ALKPHOS 90 06/24/2019    AST 13 06/24/2019    ALT 18 06/24/2019       ASSESSMENT AND PLAN:      Yoel Blackburn is a 20 y.o. male with Body mass index is 46.92 kg/m². ,  patient is deemed appropriate candidate for weight loss surgery by our multidisciplinary team.     Both open and laparoscopic approach were explained in details. Benefits and risks explained. Informed consent obtained. Risks including but not limited to; Infection, bleeding, gastric leak or obstruction, persistent nausea, vomiting, or reflux, injury to surrounding structures, risks of anesthesia, stricture, delayed gastric emptying,

## 2019-07-09 ENCOUNTER — APPOINTMENT (OUTPATIENT)
Dept: GENERAL RADIOLOGY | Age: 24
DRG: 621 | End: 2019-07-09
Attending: SURGERY
Payer: COMMERCIAL

## 2019-07-09 VITALS
HEART RATE: 63 BPM | SYSTOLIC BLOOD PRESSURE: 138 MMHG | WEIGHT: 315 LBS | BODY MASS INDEX: 45.1 KG/M2 | HEIGHT: 70 IN | TEMPERATURE: 98.2 F | RESPIRATION RATE: 14 BRPM | OXYGEN SATURATION: 96 % | DIASTOLIC BLOOD PRESSURE: 74 MMHG

## 2019-07-09 LAB
ANION GAP SERPL CALCULATED.3IONS-SCNC: 15 MMOL/L (ref 3–16)
BUN BLDV-MCNC: 7 MG/DL (ref 7–20)
CALCIUM SERPL-MCNC: 9.6 MG/DL (ref 8.3–10.6)
CHLORIDE BLD-SCNC: 98 MMOL/L (ref 99–110)
CO2: 25 MMOL/L (ref 21–32)
CREAT SERPL-MCNC: 0.8 MG/DL (ref 0.9–1.3)
GFR AFRICAN AMERICAN: >60
GFR NON-AFRICAN AMERICAN: >60
GLUCOSE BLD-MCNC: 119 MG/DL (ref 70–99)
HCT VFR BLD CALC: 38.5 % (ref 40.5–52.5)
HEMOGLOBIN: 12.4 G/DL (ref 13.5–17.5)
MCH RBC QN AUTO: 26.8 PG (ref 26–34)
MCHC RBC AUTO-ENTMCNC: 32.2 G/DL (ref 31–36)
MCV RBC AUTO: 83.1 FL (ref 80–100)
PDW BLD-RTO: 15.1 % (ref 12.4–15.4)
PLATELET # BLD: 262 K/UL (ref 135–450)
PMV BLD AUTO: 9.8 FL (ref 5–10.5)
POTASSIUM REFLEX MAGNESIUM: 4.6 MMOL/L (ref 3.5–5.1)
RBC # BLD: 4.63 M/UL (ref 4.2–5.9)
SODIUM BLD-SCNC: 138 MMOL/L (ref 136–145)
WBC # BLD: 11.6 K/UL (ref 4–11)

## 2019-07-09 PROCEDURE — 80048 BASIC METABOLIC PNL TOTAL CA: CPT

## 2019-07-09 PROCEDURE — 6360000004 HC RX CONTRAST MEDICATION

## 2019-07-09 PROCEDURE — 6370000000 HC RX 637 (ALT 250 FOR IP): Performed by: SURGERY

## 2019-07-09 PROCEDURE — 36415 COLL VENOUS BLD VENIPUNCTURE: CPT

## 2019-07-09 PROCEDURE — 94770 HC ETCO2 MONITOR DAILY: CPT

## 2019-07-09 PROCEDURE — 2580000003 HC RX 258: Performed by: SURGERY

## 2019-07-09 PROCEDURE — 74240 X-RAY XM UPR GI TRC 1CNTRST: CPT

## 2019-07-09 PROCEDURE — 99024 POSTOP FOLLOW-UP VISIT: CPT | Performed by: NURSE PRACTITIONER

## 2019-07-09 PROCEDURE — 6360000002 HC RX W HCPCS: Performed by: SURGERY

## 2019-07-09 PROCEDURE — C9113 INJ PANTOPRAZOLE SODIUM, VIA: HCPCS | Performed by: SURGERY

## 2019-07-09 PROCEDURE — 6370000000 HC RX 637 (ALT 250 FOR IP): Performed by: NURSE PRACTITIONER

## 2019-07-09 PROCEDURE — 85027 COMPLETE CBC AUTOMATED: CPT

## 2019-07-09 PROCEDURE — 94760 N-INVAS EAR/PLS OXIMETRY 1: CPT

## 2019-07-09 PROCEDURE — APPSS180 APP SPLIT SHARED TIME > 60 MINUTES: Performed by: NURSE PRACTITIONER

## 2019-07-09 RX ORDER — ONDANSETRON 8 MG/1
8 TABLET, ORALLY DISINTEGRATING ORAL EVERY 8 HOURS PRN
Status: DISCONTINUED | OUTPATIENT
Start: 2019-07-09 | End: 2019-07-09 | Stop reason: HOSPADM

## 2019-07-09 RX ORDER — PROMETHAZINE HYDROCHLORIDE 25 MG/1
12.5 TABLET ORAL EVERY 6 HOURS PRN
Status: DISCONTINUED | OUTPATIENT
Start: 2019-07-09 | End: 2019-07-09 | Stop reason: HOSPADM

## 2019-07-09 RX ORDER — OXYCODONE HYDROCHLORIDE AND ACETAMINOPHEN 5; 325 MG/1; MG/1
2 TABLET ORAL EVERY 4 HOURS PRN
Status: DISCONTINUED | OUTPATIENT
Start: 2019-07-09 | End: 2019-07-09 | Stop reason: HOSPADM

## 2019-07-09 RX ORDER — ONDANSETRON 4 MG/1
8 TABLET, ORALLY DISINTEGRATING ORAL EVERY 8 HOURS PRN
Qty: 30 TABLET | Refills: 0 | Status: SHIPPED | OUTPATIENT
Start: 2019-07-09 | End: 2019-07-22

## 2019-07-09 RX ORDER — OXYCODONE HYDROCHLORIDE AND ACETAMINOPHEN 5; 325 MG/1; MG/1
1 TABLET ORAL EVERY 4 HOURS PRN
Status: DISCONTINUED | OUTPATIENT
Start: 2019-07-09 | End: 2019-07-09 | Stop reason: HOSPADM

## 2019-07-09 RX ORDER — ONDANSETRON 4 MG/1
8 TABLET, ORALLY DISINTEGRATING ORAL EVERY 8 HOURS PRN
Qty: 30 TABLET | Refills: 1 | Status: SHIPPED | OUTPATIENT
Start: 2019-07-09 | End: 2019-09-30 | Stop reason: ALTCHOICE

## 2019-07-09 RX ORDER — OXYCODONE HYDROCHLORIDE AND ACETAMINOPHEN 5; 325 MG/1; MG/1
1 TABLET ORAL EVERY 6 HOURS PRN
Qty: 28 TABLET | Refills: 0 | Status: SHIPPED | OUTPATIENT
Start: 2019-07-09 | End: 2019-07-22

## 2019-07-09 RX ADMIN — Medication 10 ML: at 08:45

## 2019-07-09 RX ADMIN — IOHEXOL 50 ML: 350 INJECTION, SOLUTION INTRAVENOUS at 08:00

## 2019-07-09 RX ADMIN — PANTOPRAZOLE SODIUM 40 MG: 40 INJECTION, POWDER, FOR SOLUTION INTRAVENOUS at 08:45

## 2019-07-09 RX ADMIN — Medication 3 G: at 05:02

## 2019-07-09 RX ADMIN — OXYCODONE HYDROCHLORIDE AND ACETAMINOPHEN 2 TABLET: 5; 325 TABLET ORAL at 14:48

## 2019-07-09 RX ADMIN — ONDANSETRON 4 MG: 2 INJECTION INTRAMUSCULAR; INTRAVENOUS at 03:08

## 2019-07-09 RX ADMIN — ACETAMINOPHEN 1000 MG: 10 INJECTION, SOLUTION INTRAVENOUS at 08:45

## 2019-07-09 RX ADMIN — ENOXAPARIN SODIUM 40 MG: 40 INJECTION SUBCUTANEOUS at 03:08

## 2019-07-09 ASSESSMENT — PAIN SCALES - GENERAL
PAINLEVEL_OUTOF10: 0
PAINLEVEL_OUTOF10: 2
PAINLEVEL_OUTOF10: 4
PAINLEVEL_OUTOF10: 3
PAINLEVEL_OUTOF10: 9

## 2019-07-09 NOTE — PROGRESS NOTES
Catheter removed and PCA pump stopped. Patient tolerated well.
Pt not in room at this time, unable to check end tidal CO2
Teaching / education initiated regarding perioperative experience, expectations, and pain management during stay. Patient verbalized understanding. Patient awake, alert and oriented. VSS. Denies pain. Patient states he followed preop diet and had clear liquids as instructed. Call light in reach. Continue to monitor.   Darren Augustine RN
injection 10 mg, 10 mg, Intravenous, Q2H PRN  hydrALAZINE (APRESOLINE) injection 10 mg, 10 mg, Intravenous, Q2H PRN  diphenhydrAMINE (BENADRYL) injection 12.5 mg, 12.5 mg, Intravenous, Q6H PRN  lactated ringers infusion, , Intravenous, Continuous  sodium chloride flush 0.9 % injection 10 mL, 10 mL, Intravenous, 2 times per day  sodium chloride flush 0.9 % injection 10 mL, 10 mL, Intravenous, PRN  HYDROmorphone (DILAUDID) injection 0.5 mg, 0.5 mg, Intravenous, Q3H PRN  HYDROmorphone (DILAUDID) 10 mg/50 mL PCA, , Intravenous, Continuous  naloxone (NARCAN) injection 0.4 mg, 0.4 mg, Intravenous, PRN  ondansetron (ZOFRAN) injection 4 mg, 4 mg, Intravenous, Q6H PRN  enoxaparin (LOVENOX) injection 40 mg, 40 mg, Subcutaneous, BID    Patient Active Problem List   Diagnosis    Bandemia    Obstructive sleep apnea    Morbid obesity with BMI of 50.0-59.9, adult (HCC)    Chronic GERD    Fatty liver    Iron deficiency    Vitamin D deficiency    Hyperlipidemia, mixed    Morbid obesity with BMI of 45.0-49.9, adult (HCC)    S/P laparoscopic sleeve gastrectomy     A/P  Afia Younger is a 20 y.o. male pod#1, s/p laparoscopic sleeve gastrectomy. Stable overall. UGI with no leak/obstruction, will start on Phase I diet. Patient with good urine output. Will d/c Salas catheter. Will discontinue PCA and start oral pain medications. Patient will continue to wear abdominal binder when walking for support. Patient will continue icing incisions. Plan for discharge today if nausea remains controlled, patient voids and is tolerating Phase I diet. Discussed with Dr. Rich Ruth and Nursing Staff.     MIN Zhou
your prescriptions. 24. If you use oxygen and have a portable tank please bring it  with you the DOS             25. Bring a complete list of all your medications with name and dose include any supplements. 26. Other_pats by 6/28 make appt w/pcp_________________________________________   *Please call pre admission testing if you any further questions   [id number] [place] [address]. [facility]  [phone]   [address] [place] [address]       All above information reviewed with patient in person or by phone. Patient verbalizes understanding. All questions and concerns addressed.                                                                                                  Patient/Rep__per phone/pt__________________                                                                                                                                    PRE OP INSTRUCTIONS

## 2019-07-10 ENCOUNTER — TELEPHONE (OUTPATIENT)
Dept: BARIATRICS/WEIGHT MGMT | Age: 24
End: 2019-07-10

## 2019-07-16 ENCOUNTER — TELEPHONE (OUTPATIENT)
Dept: BARIATRICS/WEIGHT MGMT | Age: 24
End: 2019-07-16

## 2019-07-18 ASSESSMENT — ENCOUNTER SYMPTOMS
ALLERGIC/IMMUNOLOGIC NEGATIVE: 1
GASTROINTESTINAL NEGATIVE: 1
EYES NEGATIVE: 1
RESPIRATORY NEGATIVE: 1
ROS SKIN COMMENTS: ABDOMINAL SURGICAL INCISIONS.

## 2019-07-18 NOTE — PROGRESS NOTES
06/24/2019    GLOB 3.1 06/24/2019     Lab Results   Component Value Date    CHOL 219 03/04/2019    TRIG 129 03/04/2019    HDL 36 03/04/2019    LDLCALC 157 03/04/2019    LABVLDL 26 03/04/2019     Lab Results   Component Value Date    TSHREFLEX 3.19 03/04/2019     Lab Results   Component Value Date    IRON 36 03/04/2019    TIBC 305 03/04/2019    LABIRON 12 03/04/2019     Lab Results   Component Value Date    SSENRUDW13 450 03/04/2019    FOLATE 6.03 03/04/2019     Lab Results   Component Value Date    VITD25 9.3 03/04/2019     Lab Results   Component Value Date    LABA1C 5.6 03/04/2019    .0 03/04/2019     Review of Systems   Constitutional: Negative. HENT: Negative. Eyes: Negative. Respiratory: Negative. Cardiovascular: Negative. Gastrointestinal: Negative. Endocrine: Negative. Genitourinary: Negative. Musculoskeletal: Negative. Skin:        Abdominal surgical incisions. Allergic/Immunologic: Negative. Neurological: Negative. Hematological: Negative. Psychiatric/Behavioral: Negative. Objective:   Physical Exam   Constitutional: He is oriented to person, place, and time. He appears well-developed and well-nourished. HENT:   Head: Normocephalic and atraumatic. Eyes: Pupils are equal, round, and reactive to light. Conjunctivae are normal.   Neck: Normal range of motion. Neck supple. Cardiovascular: Normal rate. Pulmonary/Chest: Effort normal.   Abdominal: Soft. Musculoskeletal: Normal range of motion. Neurological: He is alert and oriented to person, place, and time. Skin: Skin is warm and dry. Surgical incisions healing well. Trimmed exposed suture and provided PSO to apply pea size amount to incision daily for 1 week. Psychiatric: He has a normal mood and affect. His behavior is normal. Judgment and thought content normal.   Vitals reviewed. Assessment and Plan:   Patient is 2 weeks s/p sleeve gastrectomy, down 25.8 lbs.  The patient's current Body

## 2019-07-22 ENCOUNTER — OFFICE VISIT (OUTPATIENT)
Dept: BARIATRICS/WEIGHT MGMT | Age: 24
End: 2019-07-22

## 2019-07-22 VITALS
RESPIRATION RATE: 18 BRPM | DIASTOLIC BLOOD PRESSURE: 60 MMHG | SYSTOLIC BLOOD PRESSURE: 107 MMHG | BODY MASS INDEX: 46.39 KG/M2 | HEIGHT: 69 IN | WEIGHT: 313.2 LBS | HEART RATE: 98 BPM

## 2019-07-22 DIAGNOSIS — K76.0 FATTY LIVER: ICD-10-CM

## 2019-07-22 DIAGNOSIS — G47.33 OBSTRUCTIVE SLEEP APNEA: ICD-10-CM

## 2019-07-22 DIAGNOSIS — Z98.84 S/P LAPAROSCOPIC SLEEVE GASTRECTOMY: ICD-10-CM

## 2019-07-22 DIAGNOSIS — E78.2 HYPERLIPIDEMIA, MIXED: ICD-10-CM

## 2019-07-22 DIAGNOSIS — E66.01 MORBID OBESITY WITH BMI OF 45.0-49.9, ADULT (HCC): Primary | ICD-10-CM

## 2019-07-22 DIAGNOSIS — K21.9 CHRONIC GERD: ICD-10-CM

## 2019-07-22 PROCEDURE — 99024 POSTOP FOLLOW-UP VISIT: CPT | Performed by: NURSE PRACTITIONER

## 2019-07-25 ENCOUNTER — TELEPHONE (OUTPATIENT)
Dept: BARIATRICS/WEIGHT MGMT | Age: 24
End: 2019-07-25

## 2019-08-22 ENCOUNTER — OFFICE VISIT (OUTPATIENT)
Dept: BARIATRICS/WEIGHT MGMT | Age: 24
End: 2019-08-22

## 2019-08-22 VITALS
HEART RATE: 63 BPM | BODY MASS INDEX: 45.62 KG/M2 | WEIGHT: 308 LBS | OXYGEN SATURATION: 98 % | DIASTOLIC BLOOD PRESSURE: 64 MMHG | RESPIRATION RATE: 18 BRPM | HEIGHT: 69 IN | SYSTOLIC BLOOD PRESSURE: 123 MMHG

## 2019-08-22 DIAGNOSIS — E66.01 MORBID OBESITY WITH BMI OF 50.0-59.9, ADULT (HCC): Primary | ICD-10-CM

## 2019-08-22 DIAGNOSIS — G47.33 OBSTRUCTIVE SLEEP APNEA: ICD-10-CM

## 2019-08-22 DIAGNOSIS — Z98.84 S/P LAPAROSCOPIC SLEEVE GASTRECTOMY: ICD-10-CM

## 2019-08-22 DIAGNOSIS — K21.9 CHRONIC GERD: ICD-10-CM

## 2019-08-22 DIAGNOSIS — K76.0 FATTY LIVER: ICD-10-CM

## 2019-08-22 DIAGNOSIS — E78.2 HYPERLIPIDEMIA, MIXED: ICD-10-CM

## 2019-08-22 PROCEDURE — 99024 POSTOP FOLLOW-UP VISIT: CPT | Performed by: SURGERY

## 2019-08-22 NOTE — PROGRESS NOTES
Dietary Assessment Note    Vitals:   Vitals:    08/22/19 0929   Resp: 18   SpO2: 98%   Weight: (!) 308 lb (139.7 kg)   Height: 5' 9\" (1.753 m)   Patient lost 5.2 lbs over past ~ 4 weeks. Total Weight Loss: 75.8    Labs reviewed: no new labs since surgery    Protein intake: 60-80 grams/day - 1 protein shake per day     Fluid intake: 64 oz/day - water / powerade zero / *states chocolate milk 1x/wk     Multivitamin/mineral intake: Fusion - how many/day: 4     Calcium intake: none     Other: none     Exercise: Yes - up moving around     Nutrition Assessment: 6 week post-op visit. Pt states he is able to eat ~1/2 cup volume at one sitting.    Pt is following 30 30 30 rule.      B- egg OR chopped chicken  S- protein shake  S- chopped chicken w/ oyster crackers  L- copped chicken  S- sometimes  D- chopped chicken OR [employer] hamburger (just burger)  S- sf vanilla pudding OR greek yogurt     Food Intolerances/issues: protein shake makes him go to the bathroom but otherwise has constipation     Client Concerns: none     Goals:   Advance to phase 4  Avoid chocolate milk  Limit/avoid [employer] burger     Plan: F/u at 12 weeks OR as directed     Kaylan Meléndez

## 2019-09-05 RX ORDER — OMEPRAZOLE 20 MG/1
20 CAPSULE, DELAYED RELEASE ORAL DAILY
Qty: 30 CAPSULE | Refills: 3 | Status: SHIPPED | OUTPATIENT
Start: 2019-09-05 | End: 2019-09-30 | Stop reason: ALTCHOICE

## 2019-09-27 ASSESSMENT — ENCOUNTER SYMPTOMS
RESPIRATORY NEGATIVE: 1
GASTROINTESTINAL NEGATIVE: 1
ALLERGIC/IMMUNOLOGIC NEGATIVE: 1
EYES NEGATIVE: 1

## 2019-09-28 NOTE — PROGRESS NOTES
[facility]) Physicians   [employer]    Subjective:      Patient ID: Shiraz Hurtado is a 30 y.o. male    HPI    12 weeks s/p sleeve gastrectomy    Shiraz Hurtado is a 30 y.o. obese male , Body mass index is 45.63 kg/m². Patient denies any nausea, vomiting, fevers, chills, shortness of breath, chest pain, constipation or urinary symptoms. Denies any heartburn nor dysphagia.     Past Medical History:   Diagnosis Date    Anxiety     Asthma     Hypertension     no current problems    Migraines     Obesity     Reflux esophagitis     Sleep apnea      Past Surgical History:   Procedure Laterality Date    PLANTAR FASCIA SURGERY      SLEEVE GASTRECTOMY N/A 7/8/2019    LAPAROSCOPIC SLEEVE GASTRECTOMY - ETHICON performed by Maddison Trammell MD at [address] [facility] TYMPANOSTOMY TUBE PLACEMENT      UPPER GASTROINTESTINAL ENDOSCOPY N/A 4/19/2019    EGD BIOPSY performed by Maddison Trammell MD at [address] [facility]     Family History   Problem Relation Age of Onset   Shauna Fester Asthma Mother     Depression Mother     High Cholesterol Mother    Shauna Fester Migraines Mother     Obesity Mother     Diabetes Father     Hypertension Father     High Cholesterol Father     Obesity Father     Asthma Sister     Coronary Art Dis Sister     Coronary Art Dis Brother     Coronary Art Dis Maternal Uncle     Mental Illness Maternal Uncle     Asthma Paternal Aunt     Coronary Art Dis Paternal Aunt     Osteoarthritis Paternal Aunt     Cancer Maternal Grandmother     Hypertension Maternal Grandmother     High Cholesterol Maternal Grandmother     Migraines Maternal Grandmother     Irritable Bowel Syndrome Maternal Grandmother     Osteoarthritis Maternal Grandmother     Coronary Art Dis Maternal Grandfather     Cancer Maternal Grandfather     Stroke Maternal Grandfather     Diabetes Maternal Grandfather     Hypertension Maternal Grandfather     High Cholesterol Maternal Grandfather     Mental Illness 03/04/2019    LABVLDL 26 03/04/2019     Lab Results   Component Value Date    TSHREFLEX 3.19 03/04/2019     Lab Results   Component Value Date    IRON 36 03/04/2019    TIBC 305 03/04/2019    LABIRON 12 03/04/2019     Lab Results   Component Value Date    [id number] 450 03/04/2019    FOLATE 6.03 03/04/2019     Lab Results   Component Value Date    VITD25 9.3 03/04/2019     Lab Results   Component Value Date    LABA1C 5.6 03/04/2019    .0 03/04/2019     Review of Systems   Constitutional: Negative. HENT: Negative. Eyes: Negative. Respiratory: Negative. Cardiovascular: Negative. Gastrointestinal: Negative. Endocrine: Negative. Genitourinary: Negative. Musculoskeletal: Negative. Skin: Negative. Allergic/Immunologic: Negative. Neurological: Negative. Hematological: Negative. Psychiatric/Behavioral: Negative. Objective:   Physical Exam   Constitutional: He is oriented to person, place, and time. He appears well-developed and well-nourished. HENT:   Head: Normocephalic and atraumatic. Eyes: Pupils are equal, round, and reactive to light. Conjunctivae are normal.   Neck: Normal range of motion. Neck supple. Cardiovascular: Normal rate. Pulmonary/Chest: Effort normal.   Abdominal: Soft. Musculoskeletal: Normal range of motion. Neurological: He is alert and oriented to person, place, and time. Skin: Skin is warm and dry. Psychiatric: He has a normal mood and affect. His behavior is normal. Judgment and thought content normal.   Vitals reviewed. Assessment and Plan:   Patient is 12 weeks s/p sleeve gastrectomy, up 1 lb with a total weight loss of 74.8 lbs. The patient's current Body mass index is 45.63 kg/m². (9/30/19). He is doing well, denies n/v/dysphagia or reflux. He is tolerating diet, getting adequate fluids and protein. Hard to see where the reduced weight loss and slight weight gain is coming from based on patient's recall.  He is exercising 2-3 days per week at the [facility]. Encouraged continued physical activity. He is taking vitamins as instructed. He did meet with the registered dietitian for continued follow up. I agree with recommendations and plan. We will see him back in 3 months for continued follow up. Discussed that at next visit we would order follow up labs. Patient had some recent labs with his PCP and had a slight decrease in his Hgb and was started on iron daily, but his iron was not checked. A total of 15 minutes was spent face-to-face with the patient and over half of that time was spent counseling the patient on proper dietary behaviors, exercise and post-op progress.

## 2019-09-30 ENCOUNTER — OFFICE VISIT (OUTPATIENT)
Dept: BARIATRICS/WEIGHT MGMT | Age: 24
End: 2019-09-30

## 2019-09-30 VITALS
RESPIRATION RATE: 16 BRPM | HEIGHT: 69 IN | BODY MASS INDEX: 45.77 KG/M2 | HEART RATE: 60 BPM | WEIGHT: 309 LBS | SYSTOLIC BLOOD PRESSURE: 116 MMHG | DIASTOLIC BLOOD PRESSURE: 60 MMHG

## 2019-09-30 DIAGNOSIS — G47.33 OBSTRUCTIVE SLEEP APNEA: Primary | ICD-10-CM

## 2019-09-30 DIAGNOSIS — K21.9 CHRONIC GERD: ICD-10-CM

## 2019-09-30 DIAGNOSIS — E78.2 HYPERLIPIDEMIA, MIXED: ICD-10-CM

## 2019-09-30 DIAGNOSIS — Z98.84 S/P LAPAROSCOPIC SLEEVE GASTRECTOMY: ICD-10-CM

## 2019-09-30 DIAGNOSIS — E66.01 MORBID OBESITY WITH BMI OF 45.0-49.9, ADULT (HCC): ICD-10-CM

## 2019-09-30 DIAGNOSIS — K76.0 FATTY LIVER: ICD-10-CM

## 2019-09-30 PROCEDURE — 99024 POSTOP FOLLOW-UP VISIT: CPT | Performed by: NURSE PRACTITIONER

## 2019-09-30 RX ORDER — FAMOTIDINE 20 MG/1
20 TABLET, FILM COATED ORAL
COMMUNITY
Start: 2019-09-16 | End: 2020-09-15

## 2019-09-30 RX ORDER — FEXOFENADINE HYDROCHLORIDE 180 MG/1
TABLET, FILM COATED ORAL
Refills: 1 | COMMUNITY
Start: 2019-09-16

## 2019-12-05 ENCOUNTER — OFFICE VISIT (OUTPATIENT)
Dept: BARIATRICS/WEIGHT MGMT | Age: 24
End: 2019-12-05
Payer: COMMERCIAL

## 2019-12-05 VITALS
HEART RATE: 59 BPM | DIASTOLIC BLOOD PRESSURE: 70 MMHG | HEIGHT: 69 IN | OXYGEN SATURATION: 98 % | SYSTOLIC BLOOD PRESSURE: 116 MMHG | RESPIRATION RATE: 18 BRPM | BODY MASS INDEX: 44.85 KG/M2 | WEIGHT: 302.8 LBS

## 2019-12-05 DIAGNOSIS — E66.01 MORBID OBESITY WITH BMI OF 45.0-49.9, ADULT (HCC): Primary | ICD-10-CM

## 2019-12-05 DIAGNOSIS — K21.9 CHRONIC GERD: ICD-10-CM

## 2019-12-05 DIAGNOSIS — E78.2 HYPERLIPIDEMIA, MIXED: ICD-10-CM

## 2019-12-05 DIAGNOSIS — G47.33 OBSTRUCTIVE SLEEP APNEA: ICD-10-CM

## 2019-12-05 DIAGNOSIS — Z98.84 S/P LAPAROSCOPIC SLEEVE GASTRECTOMY: ICD-10-CM

## 2019-12-05 PROCEDURE — 1036F TOBACCO NON-USER: CPT | Performed by: SURGERY

## 2019-12-05 PROCEDURE — 99213 OFFICE O/P EST LOW 20 MIN: CPT | Performed by: SURGERY

## 2019-12-05 PROCEDURE — G8417 CALC BMI ABV UP PARAM F/U: HCPCS | Performed by: SURGERY

## 2019-12-05 PROCEDURE — G8427 DOCREV CUR MEDS BY ELIG CLIN: HCPCS | Performed by: SURGERY

## 2019-12-05 PROCEDURE — G8484 FLU IMMUNIZE NO ADMIN: HCPCS | Performed by: SURGERY

## 2020-03-19 PROBLEM — E66.01 MORBID OBESITY WITH BMI OF 40.0-44.9, ADULT (HCC): Status: ACTIVE | Noted: 2020-03-19

## 2020-03-19 NOTE — PROGRESS NOTES
[facility]) Physicians   [employer]    Subjective:      Patient ID: Ameya Hendrickson is a 30 y.o. male    HPI  Due to the COVID-19 restrictions on close contact interactions the patient's visit was conducted via telephone in jonny of a face to face visit. Patient has consented to have this visit conducted via telephone and I am conducting it from the office. The patient is here through telemedicine for their 9 month s/p sleeve gastrectomy visit. Ameya Hendrickson is a 30 y.o. obese male , Body mass index is 42.83 kg/m². Patient denies any nausea, vomiting, fevers, chills, shortness of breath, chest pain, constipation or urinary symptoms. Denies any heartburn nor dysphagia.     Past Medical History:   Diagnosis Date    Anxiety     Asthma     Hypertension     no current problems    Migraines     Obesity     Reflux esophagitis     Sleep apnea      Past Surgical History:   Procedure Laterality Date    PLANTAR FASCIA SURGERY      SLEEVE GASTRECTOMY N/A 7/8/2019    LAPAROSCOPIC SLEEVE GASTRECTOMY - ETHICON performed by Alivia Ramos MD at [address] [facility] TYMPANOSTOMY TUBE PLACEMENT      UPPER GASTROINTESTINAL ENDOSCOPY N/A 4/19/2019    EGD BIOPSY performed by Alivia Ramos MD at [address] [facility]     Family History   Problem Relation Age of Onset    Asthma Mother     Depression Mother     High Cholesterol Mother     Migraines Mother     Obesity Mother     Diabetes Father     Hypertension Father     High Cholesterol Father     Obesity Father     Asthma Sister     Coronary Art Dis Sister     Coronary Art Dis Brother     Coronary Art Dis Maternal Uncle     Mental Illness Maternal Uncle     Asthma Paternal Aunt     Coronary Art Dis Paternal Aunt     Osteoarthritis Paternal Aunt     Cancer Maternal Grandmother     Hypertension Maternal Grandmother     High Cholesterol Maternal Grandmother     Migraines Maternal Grandmother     Irritable Bowel Syndrome Maternal AGRATIO 1.5 06/24/2019    BILITOT 0.4 06/24/2019    ALKPHOS 90 06/24/2019    ALT 18 06/24/2019    AST 13 06/24/2019    GLOB 3.1 06/24/2019     Lab Results   Component Value Date    CHOL 219 03/04/2019    TRIG 129 03/04/2019    HDL 36 03/04/2019    LDLCALC 157 03/04/2019    LABVLDL 26 03/04/2019     Lab Results   Component Value Date    TSHREFLEX 3.19 03/04/2019     Lab Results   Component Value Date    IRON 36 03/04/2019    TIBC 305 03/04/2019    LABIRON 12 03/04/2019     Lab Results   Component Value Date    [id number] 450 03/04/2019    FOLATE 6.03 03/04/2019     Lab Results   Component Value Date    VITD25 9.3 03/04/2019     Lab Results   Component Value Date    LABA1C 5.6 03/04/2019    .0 03/04/2019     Review of Systems   Constitutional: Negative. HENT: Negative. Eyes: Negative. Respiratory: Negative. Cardiovascular: Negative. Gastrointestinal: Negative. Endocrine: Negative. Genitourinary: Negative. Musculoskeletal: Negative. Skin: Negative. Allergic/Immunologic: Negative. Neurological: Negative. Hematological: Negative. Psychiatric/Behavioral: Negative. Assessment and Plan:   Patient is here through telemedicine for the 9 months s/p sleeve gastrectomy, down 12.8 lbs with a total weight loss of 93.8 lbs. The patient's current Body mass index is 42.83 kg/m². (3/23/20). He is doing well, denies n/v/dysphagia or reflux. He is tolerating diet, getting adequate fluids and protein. He was exercising at the gym before it closed, but is trying to walk weather permitting. Encouraged continued physical activity as able. He is taking vitamins as instructed. He did meet with the registered dietitian for continued follow up. I agree with recommendations and plan. Had not completed labs ordered at last visit. Explained to patient to just complete them prior to his next visit with Dr. Cathryn Dooley. We will see him back in 3 months for continued follow up.  A total of 15 minutes was spent conversing with the patient and over half of that time was spent counseling the patient on proper dietary behaviors, exercise and post-op progress.

## 2020-03-19 NOTE — PATIENT INSTRUCTIONS
Patient received dietary handouts and education. Diet tips to help make you successful postoperatively    Eating habits after surgery will need to be a long-term change. Eating habits are so ingrained that it can be difficult to change so having made these changes for surgery is a great accomplishment. It is important to maintain these new eating habits after surgery. Also, remember that overall health, age, and genetics make each person's weight loss progress different. Do not compare your progress, the amount you eat, or exercise to other patients.  Protein first at every meal- Eat the protein portion of your meal first. Eating protein helps the body feel full and sends a signal to stop eating. Protein is very important in building tissue in the body.  Eat at least 4 times per day- This includes protein supplements and small meals with a high amount of protein   Chewing your food thoroughly- Eating too quickly and improper chewing can cause pain and vomiting after surgery.  Slowing down the speed at which you eat- Refill your fork only after you swallow. Adopt a new pattern of eating by taking a bite of food and putting your utensil down between bites. This will help to reduce the feeling of food being stuck.    Drink water and other fluids slowly- Drink at least 48 ounces per day minimum. Sip fluids as if they were hot beverages. If you find it difficult to stop gulping liquids, try using a sippy cup or a sport top water bottle.  Make sure you are eating meals without drinking fluids- After surgery you will not be allowed to drink fluids 30 minutes before, during, or 30 minutes after your meal (30/30/30 rule). This will be a life-long behavior change. The reason for the rule is to keep food from passing through your smaller stomach more rapidly.  This will cause you to feel hungry again shortly after your meal.   Continue to avoid caffeine and carbonated beverages- Caffeine acts as a

## 2020-03-23 ENCOUNTER — OFFICE VISIT (OUTPATIENT)
Dept: BARIATRICS/WEIGHT MGMT | Age: 25
End: 2020-03-23
Payer: COMMERCIAL

## 2020-03-23 VITALS — HEIGHT: 69 IN | BODY MASS INDEX: 42.95 KG/M2 | WEIGHT: 290 LBS

## 2020-03-23 PROCEDURE — 99422 OL DIG E/M SVC 11-20 MIN: CPT | Performed by: NURSE PRACTITIONER

## 2020-06-09 ENCOUNTER — TELEPHONE (OUTPATIENT)
Dept: BARIATRICS/WEIGHT MGMT | Age: 25
End: 2020-06-09

## 2020-06-09 NOTE — TELEPHONE ENCOUNTER
Labs ordered in epic he can go ahead and get it done at an [facility] facility needs to be fasting

## 2020-09-01 ENCOUNTER — OFFICE VISIT (OUTPATIENT)
Dept: BARIATRICS/WEIGHT MGMT | Age: 25
End: 2020-09-01
Payer: COMMERCIAL

## 2020-09-01 VITALS
HEIGHT: 69 IN | RESPIRATION RATE: 18 BRPM | OXYGEN SATURATION: 98 % | WEIGHT: 299.6 LBS | DIASTOLIC BLOOD PRESSURE: 62 MMHG | HEART RATE: 63 BPM | SYSTOLIC BLOOD PRESSURE: 119 MMHG | BODY MASS INDEX: 44.38 KG/M2

## 2020-09-01 PROCEDURE — G8427 DOCREV CUR MEDS BY ELIG CLIN: HCPCS | Performed by: SURGERY

## 2020-09-01 PROCEDURE — 1036F TOBACCO NON-USER: CPT | Performed by: SURGERY

## 2020-09-01 PROCEDURE — G8417 CALC BMI ABV UP PARAM F/U: HCPCS | Performed by: SURGERY

## 2020-09-01 PROCEDURE — 99213 OFFICE O/P EST LOW 20 MIN: CPT | Performed by: SURGERY

## 2020-09-01 NOTE — PROGRESS NOTES
Dietary Assessment Note      Vitals:   Vitals:    09/01/20 1153   BP: 119/62   Pulse: 63   Resp: 18   SpO2: 98%   Weight: 299 lb 9.6 oz (135.9 kg)   Height: 5' 9\" (1.753 m)   Patient gained 9.6 lbs over past ~6 month. Pt states he has been struggling with COVID d/t the price of meat. Total Weight Loss: 84.2 lbs    Labs reviewed: no new labs    Protein intake: 60-80 grams/day - 2 protein shake OR water per day      Fluid intake: 48-64 oz/day - water    Multivitamin/mineral intake: yes - 4 fusion per day    Calcium intake: OTC calcium 2x/day     Other: none    Exercise: yes - treadmill 20 min 2-3x/wk plus just generally more active    Nutrition Assessment: 1 year 2 month post-op visit. Pt states he is comfortable w/ where he is at.      B- protein shake  S- 1-2 HB egg  L- skips  S- unsweetened applesauce  D- corn & tomato OR does eat pulled bbq sometimes when he is working   S- protein water    Amount able to eat per sitting: ~3/4 cup    Following 30/30/30 rule: yes    Food Intolerances/issues: none    Client Concerns: none    Goals:   - Encouraged tracking, aiming for 1200 calories    Plan: f/u as directed    [employer]

## 2020-09-01 NOTE — PATIENT INSTRUCTIONS
Patient received dietary handouts and education.     Goals:   - Encouraged tracking, aiming for 1200 calories

## 2020-09-01 NOTE — PROGRESS NOTES
Asthma Paternal Aunt     Coronary Art Dis Paternal Aunt     Osteoarthritis Paternal Aunt     Cancer Maternal Grandmother     Hypertension Maternal Grandmother     High Cholesterol Maternal Grandmother     Migraines Maternal Grandmother     Irritable Bowel Syndrome Maternal Grandmother     Osteoarthritis Maternal Grandmother     Coronary Art Dis Maternal Grandfather     Cancer Maternal Grandfather     Stroke Maternal Grandfather     Diabetes Maternal Grandfather     Hypertension Maternal Grandfather     High Cholesterol Maternal Grandfather     Mental Illness Maternal Grandfather     Obesity Maternal Grandfather      Social History     Tobacco Use    Smoking status: Never Smoker    Smokeless tobacco: Never Used   Substance Use Topics    Alcohol use: Yes     Comment: rare     I counseled the patient on the importance of not smoking and risks of ETOH. No Known Allergies  Vitals:    09/01/20 1153   BP: 119/62   Pulse: 63   Resp: 18   SpO2: 98%   Weight: 299 lb 9.6 oz (135.9 kg)   Height: 5' 9\" (1.753 m)       Body mass index is 44.24 kg/m².       Lab Results   Component Value Date    WBC 11.6 07/09/2019    RBC 4.63 07/09/2019    HGB 12.4 07/09/2019    HCT 38.5 07/09/2019    MCV 83.1 07/09/2019    MCH 26.8 07/09/2019    MCHC 32.2 07/09/2019    MPV 9.8 07/09/2019    NEUTOPHILPCT 57.0 03/04/2019    LYMPHOPCT 35.0 03/04/2019    MONOPCT 3.0 03/04/2019    EOSRELPCT 4.0 03/04/2019    BASOPCT 0.0 03/04/2019    NEUTROABS 5.0 03/04/2019    LYMPHSABS 3.0 03/04/2019    MONOSABS 0.3 03/04/2019    EOSABS 0.3 03/04/2019     Lab Results   Component Value Date     07/09/2019    K 4.6 07/09/2019    CL 98 07/09/2019    CO2 25 07/09/2019    ANIONGAP 15 07/09/2019    GLUCOSE 119 07/09/2019    BUN 7 07/09/2019    CREATININE 0.8 07/09/2019    LABGLOM >60 07/09/2019    GFRAA >60 07/09/2019    CALCIUM 9.6 07/09/2019    PROT 7.6 06/24/2019    LABALBU 4.5 06/24/2019    AGRATIO 1.5 06/24/2019    BILITOT 0.4 06/24/2019 ALKPHOS 90 06/24/2019    ALT 18 06/24/2019    AST 13 06/24/2019    GLOB 3.1 06/24/2019     Lab Results   Component Value Date    CHOL 219 03/04/2019    TRIG 129 03/04/2019    HDL 36 03/04/2019    LDLCALC 157 03/04/2019    LABVLDL 26 03/04/2019     Lab Results   Component Value Date    TSHREFLEX 3.19 03/04/2019     Lab Results   Component Value Date    IRON 36 03/04/2019    TIBC 305 03/04/2019    LABIRON 12 03/04/2019     Lab Results   Component Value Date    EOXFSAGW36 450 03/04/2019    FOLATE 6.03 03/04/2019     Lab Results   Component Value Date    VITD25 9.3 03/04/2019     Lab Results   Component Value Date    LABA1C 5.6 03/04/2019    .0 03/04/2019         Current Outpatient Medications:     Multiple Vitamins-Minerals (BARIATRIC FUSION) CHEW, Take 4 tablets by mouth daily, Disp: , Rfl:     famotidine (PEPCID) 20 MG tablet, Take 20 mg by mouth, Disp: , Rfl:     ALLERGY RELIEF 180 MG tablet, TAKE 1 TABLET BY MOUTH DAILY. , Disp: , Rfl: 1    DULoxetine (CYMBALTA) 60 MG extended release capsule, Take 60 mg by mouth daily, Disp: , Rfl:       Review of Systems - History obtained from the patient  General ROS: negative  Psychological ROS: negative  Ophthalmic ROS: negative  Neurological ROS: negative  ENT ROS: negative  Allergy and Immunology ROS: negative  Hematological and Lymphatic ROS: negative  Endocrine ROS: negative  Breast ROS: negative  Respiratory ROS: negative  Cardiovascular ROS: negative  Gastrointestinal ROS:negative  Genito-Urinary ROS: negative  Musculoskeletal ROS: negative   Skin ROS: negative    Physical Exam   CONSTITUTIONAL:  awake, alert, cooperative, no apparent distress, and appears stated age  EYES:  Lids and lashes normal, pupils equal, round , extra ocular muscles intact, sclera clear, conjunctiva normal  ENT:  Normocephalic, without obvious abnormality, atraumatic, external ears without lesions.   NECK:  Supple, symmetrical, normal range of motion, skin Bandemia    Obstructive sleep apnea    Morbid obesity with BMI of 50.0-59.9, adult (HCC)    Chronic GERD    Fatty liver    Iron deficiency    Vitamin D deficiency    Hyperlipidemia, mixed    Morbid obesity with BMI of 45.0-49.9, adult ([address]    S/P laparoscopic sleeve gastrectomy    Morbid obesity with BMI of 40.0-44.9, adult ([address]    and importance of weight loss to alleviate those co morbid conditions. I encouraged the patient to continue exercise and keeping healthy eating habits. Also counseled the patient extensively on post surgery care. I spent a total of 15 minutes face to face with the patient and greater than 50% of the time was spent in counseling, answering questions, addressing concerns, reviewing labs and/or other studies with the patient as well as coordinating care and discussed dietary plan with the dietitian. Simba Kennedy has gained few pounds since last visit. Patient endorses 20 minutes on the treadmill to 3 times a week and is very happy where he is. Does not seem interested in making any major changes to his diet. I think it will be a good idea for him to follow-up with [facility] our behaviorist to ensure he continues to do well long-term. RTC in 6 months  Healthy Diet and Exercise   Nutrition labs (patient had labs July 2020 at [facility] and they are scanned in media, they were reviewed with the patient)     Patient advised that its their responsibility to follow up for care, studies, referrals and/or labs ordered today.

## 2021-03-12 ENCOUNTER — OFFICE VISIT (OUTPATIENT)
Dept: BARIATRICS/WEIGHT MGMT | Age: 26
End: 2021-03-12
Payer: COMMERCIAL

## 2021-03-12 VITALS — TEMPERATURE: 96.8 F

## 2021-03-12 DIAGNOSIS — Z71.89 INDIVIDUAL COUNSELING ENCOUNTER: Primary | ICD-10-CM

## 2021-03-12 PROCEDURE — 99999 PR OFFICE/OUTPT VISIT,PROCEDURE ONLY: CPT | Performed by: SOCIAL WORKER

## 2021-03-12 NOTE — PROGRESS NOTES
Keysha Moura is a 30 y.o. male who presents today for individual counseling encounter / psychosocial assessment at 1 year post op. Keysha Moura is a very pleasant individual who presented open throughout assessment. Patient presented with appropriate insight and cognition. This note will not be viewable in [facility] for the following reason(s). This is a Psychotherapy Note. Presenting Information:   1 year post op    Home life / Family relationships / Supports   Patient lives with his parents and siblings. 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 What has worked BEST for me over the past year? \"sticking to the program. I don't like exercising but I do it\"     What are some changes I would like to make within the next year? \"more weight loss, i'd like to get to be around 250\"     How am I able to continue progress AND/OR achieve my goals? \"continue doing what I am doing\"  Patient states he has a treadmill at home. Additional Questions/Concerns per patient  n/a    Behaviorist overview:   Patient appears to be doing well at this time, motivated to continue losing weight and being active. Encouraged patient to continue doing what he is doing. Encouraged patient to follow up as needed. Goals    None            Patient and therapist spent majority of session discussing above goals and ways to achieve success with each goal. Follow up care has been discussed with patient in relation to goals and concerns addressed today. Same day cancellation/no show policy was discussed with patient per behaviorist guidelines. 12 minutes was spent in assessment and direct counseling with patient.      [address] [place] [address]

## (undated) DEVICE — SPONGE,LAP,4"X18",XR,ST,5/PK,40PK/CS: Brand: MEDLINE INDUSTRIES, INC.

## (undated) DEVICE — DRAPE,LAP,CHOLE,W/TROUGHS,STERILE: Brand: MEDLINE

## (undated) DEVICE — PASSIVE LAPSCP FLTR W/ 1/4INX24 TBNG AND M LUER LCK FIT - U

## (undated) DEVICE — PROCEDURE KIT ENDOSCP CUST

## (undated) DEVICE — SHEET,DRAPE,40X58,STERILE: Brand: MEDLINE

## (undated) DEVICE — COVER LT HNDL BLU PLAS

## (undated) DEVICE — TROCAR: Brand: KII OPTICAL ACCESS SYSTEM

## (undated) DEVICE — PMI DISPOSABLE PUNCTURE CLOSURE DEVICE / SUTURE GRASPER: Brand: PMI

## (undated) DEVICE — LAPAROSCOPIC SCISSORS: Brand: EPIX LAPAROSCOPIC SCISSORS

## (undated) DEVICE — APPLICATOR PREP 26ML 0.7% IOD POVACRYLEX 74% ISO ALC ST

## (undated) DEVICE — AIR SHEET,LAT,COMFORT GLIDE, BLEND 40X80: Brand: MEDLINE

## (undated) DEVICE — KIT OR ROOM TURNOVER W/STRAP

## (undated) DEVICE — FORCEPS BX L240CM WRK CHN 2.8MM STD CAP W/ NDL MIC MESH

## (undated) DEVICE — LOTION PREP REMV 5OZ IODO CLR TINC OF BENZ DURAPREP

## (undated) DEVICE — SET VLV 3 PC AWS DISPOSABLE GRDIAN SCOPEVALET

## (undated) DEVICE — SUTURE VCRL SZ 4-0 L18IN ABSRB UD L19MM PS-2 3/8 CIR PRIM J496H

## (undated) DEVICE — TROCAR: Brand: KII FIOS FIRST ENTRY

## (undated) DEVICE — CRADLE ANK AND FT ELEV FLAT END POLY FOAM W/ VENT H NEUT

## (undated) DEVICE — DEVICE SUT W/ SZ 0 L48IN VLT POLYSRB SUT DISP ES-9 ENDO

## (undated) DEVICE — CATHETER TRAY 16 FR 5 CC FOL ANTIREFLX SAMPLING PRT DOVER

## (undated) DEVICE — RELOAD STPL H1.8-3.8MM REG THCK TISS G 6 ROW GRIPPING SURF

## (undated) DEVICE — 3M™ WARMING BLANKET, UPPER BODY, 10 PER CASE, 42268: Brand: BAIR HUGGER™

## (undated) DEVICE — RELOAD STPL H35XL60MM BLU REG B FORM NAT ARTC ECHELON

## (undated) DEVICE — STAPLER SKIN L440MM 32MM LNG 12 FIRING B FRM PWR + GRIPPING

## (undated) DEVICE — CONTROL SYRINGE LUER-LOCK TIP: Brand: MONOJECT

## (undated) DEVICE — ARM CRADLE: Brand: DEVON

## (undated) DEVICE — MOUTHPIECE ENDOSCP L CTRL OPN AND SIDE PORTS DISP

## (undated) DEVICE — SOLUTION ANTIFOG VIS SYS CLEARIFY LAPSCP

## (undated) DEVICE — GOWN,AURORA,NONREINF,RAGLAN,XXL,STERILE: Brand: MEDLINE

## (undated) DEVICE — RELOAD STPL H4.1X2MM DIA60MM THCK TISS GRN 6 ROW PWR GST B

## (undated) DEVICE — STERILE POLYISOPRENE POWDER-FREE SURGICAL GLOVES: Brand: PROTEXIS

## (undated) DEVICE — SKIN AFFIX SURG ADHESIVE 72/CS 0.55ML: Brand: MEDLINE

## (undated) DEVICE — TRUE CONTENT TO BE POPULATED AS PART OF REBRANDING: Brand: ARGYLE

## (undated) DEVICE — BW-412T DISP COMBO CLEANING BRUSH: Brand: SINGLE USE COMBINATION CLEANING BRUSH

## (undated) DEVICE — SOLUTION IV IRRIG POUR BRL 0.9% SODIUM CHL 2F7124

## (undated) DEVICE — SHEARS ENDOSCP L36CM DIA5MM ULTRASONIC CRV TIP HARM

## (undated) DEVICE — SOLUTION IV IRRIG WATER 500ML POUR BRL ST 2F7113

## (undated) DEVICE — TROCARS: Brand: KII® OPTICAL ACCESS SYSTEM

## (undated) DEVICE — LAPAROSCOPY PK

## (undated) DEVICE — BOWL MED L 32OZ PLAS W/ MOLD GRAD EZ OPN PEEL PCH

## (undated) DEVICE — NEEDLE INSUF L150MM DIA2MM DISP FOR PNEUMOPERI ENDOPATH

## (undated) DEVICE — DEVICE SUT SHFT L34CM DIA 10MM 2 JAW LD UNIT ENDOSTCH

## (undated) DEVICE — NEEDLE HYPO 22GA L1.5IN BLK POLYPR HUB S STL REG BVL STR

## (undated) DEVICE — SUTURE VCRL SZ 0 L54IN ABSRB UD POLYGLACTIN 910 COAT BRAID J608H

## (undated) DEVICE — SHEET,DRAPE,53X77,STERILE: Brand: MEDLINE